# Patient Record
Sex: FEMALE | Race: WHITE | Employment: OTHER | URBAN - NONMETROPOLITAN AREA
[De-identification: names, ages, dates, MRNs, and addresses within clinical notes are randomized per-mention and may not be internally consistent; named-entity substitution may affect disease eponyms.]

---

## 2017-02-21 ENCOUNTER — DOCTOR'S OFFICE (OUTPATIENT)
Dept: URBAN - NONMETROPOLITAN AREA CLINIC 1 | Facility: CLINIC | Age: 67
Setting detail: OPHTHALMOLOGY
End: 2017-02-21
Payer: COMMERCIAL

## 2017-02-21 DIAGNOSIS — H25.13: ICD-10-CM

## 2017-02-21 DIAGNOSIS — H40.013: ICD-10-CM

## 2017-02-21 DIAGNOSIS — H04.122: ICD-10-CM

## 2017-02-21 DIAGNOSIS — H10.501: ICD-10-CM

## 2017-02-21 DIAGNOSIS — H04.121: ICD-10-CM

## 2017-02-21 DIAGNOSIS — H35.372: ICD-10-CM

## 2017-02-21 PROCEDURE — 92012 INTRM OPH EXAM EST PATIENT: CPT | Performed by: OPHTHALMOLOGY

## 2017-02-21 PROCEDURE — 83861 MICROFLUID ANALY TEARS: CPT | Performed by: OPHTHALMOLOGY

## 2017-02-21 ASSESSMENT — REFRACTION_MANIFEST
OS_VA3: 20/
OS_VA1: 20/
OD_VA3: 20/
OS_VA1: 20/
OS_VA3: 20/
OS_VA1: 20/
OD_VA1: 20/
OS_VA2: 20/
OD_VA2: 20/
OU_VA: 20/
OS_VA3: 20/
OD_VA1: 20/
OS_VA2: 20/
OD_VA1: 20/
OU_VA: 20/
OD_VA2: 20/
OS_VA2: 20/
OU_VA: 20/
OD_VA3: 20/
OD_VA3: 20/
OD_VA2: 20/

## 2017-02-21 ASSESSMENT — CONFRONTATIONAL VISUAL FIELD TEST (CVF)
OD_FINDINGS: FULL
OS_FINDINGS: FULL

## 2017-02-21 ASSESSMENT — VISUAL ACUITY
OS_BCVA: 20/30
OD_BCVA: 20/30

## 2017-02-21 ASSESSMENT — LID EXAM ASSESSMENTS
OS_BLEPHARITIS: LUL
OD_BLEPHARITIS: RUL

## 2017-02-21 ASSESSMENT — REFRACTION_CURRENTRX
OS_OVR_VA: 20/
OD_OVR_VA: 20/
OS_OVR_VA: 20/
OS_OVR_VA: 20/
OD_OVR_VA: 20/
OD_OVR_VA: 20/

## 2017-02-21 ASSESSMENT — DRY EYES - PHYSICIAN NOTES
OD_GENERALCOMMENTS: DRY EYE IMPROVED
OS_GENERALCOMMENTS: DRY EYE IMPROVED

## 2017-02-21 ASSESSMENT — REFRACTION_AUTOREFRACTION
OS_SPHERE: +0.25
OD_SPHERE: +0.25
OD_CYLINDER: -0.25
OS_CYLINDER: -0.50
OD_AXIS: 118
OS_AXIS: 048

## 2017-02-21 ASSESSMENT — SPHEQUIV_DERIVED
OD_SPHEQUIV: 0.125
OS_SPHEQUIV: 0

## 2017-02-28 ENCOUNTER — DOCTOR'S OFFICE (OUTPATIENT)
Dept: URBAN - NONMETROPOLITAN AREA CLINIC 1 | Facility: CLINIC | Age: 67
Setting detail: OPHTHALMOLOGY
End: 2017-02-28
Payer: COMMERCIAL

## 2017-02-28 ENCOUNTER — RX ONLY (RX ONLY)
Age: 67
End: 2017-02-28

## 2017-02-28 DIAGNOSIS — H10.501: ICD-10-CM

## 2017-02-28 DIAGNOSIS — H04.122: ICD-10-CM

## 2017-02-28 DIAGNOSIS — H04.121: ICD-10-CM

## 2017-02-28 PROCEDURE — 99212 OFFICE O/P EST SF 10 MIN: CPT | Performed by: OPHTHALMOLOGY

## 2017-02-28 ASSESSMENT — REFRACTION_CURRENTRX
OD_OVR_VA: 20/
OS_OVR_VA: 20/
OD_OVR_VA: 20/
OS_OVR_VA: 20/
OD_OVR_VA: 20/
OS_OVR_VA: 20/

## 2017-02-28 ASSESSMENT — REFRACTION_MANIFEST
OS_VA2: 20/
OD_VA1: 20/
OD_VA3: 20/
OS_VA1: 20/
OS_VA2: 20/
OS_VA2: 20/
OU_VA: 20/
OS_VA1: 20/
OU_VA: 20/
OD_VA2: 20/
OS_VA3: 20/
OS_VA3: 20/
OD_VA3: 20/
OS_VA1: 20/
OD_VA1: 20/
OD_VA3: 20/
OS_VA3: 20/
OD_VA2: 20/
OD_VA1: 20/
OD_VA2: 20/
OU_VA: 20/

## 2017-02-28 ASSESSMENT — REFRACTION_AUTOREFRACTION
OD_SPHERE: +0.25
OS_AXIS: 048
OS_CYLINDER: -0.50
OD_AXIS: 118
OD_CYLINDER: -0.25
OS_SPHERE: +0.25

## 2017-02-28 ASSESSMENT — DRY EYES - PHYSICIAN NOTES
OD_GENERALCOMMENTS: DRY EYE IMPROVED
OS_GENERALCOMMENTS: DRY EYE IMPROVED

## 2017-02-28 ASSESSMENT — LID EXAM ASSESSMENTS
OS_BLEPHARITIS: LUL
OD_BLEPHARITIS: RUL

## 2017-02-28 ASSESSMENT — SPHEQUIV_DERIVED
OD_SPHEQUIV: 0.125
OS_SPHEQUIV: 0

## 2017-02-28 ASSESSMENT — VISUAL ACUITY
OD_BCVA: 20/30-2
OS_BCVA: 20/30-2

## 2017-03-24 ENCOUNTER — DOCTOR'S OFFICE (OUTPATIENT)
Dept: URBAN - NONMETROPOLITAN AREA CLINIC 1 | Facility: CLINIC | Age: 67
Setting detail: OPHTHALMOLOGY
End: 2017-03-24
Payer: COMMERCIAL

## 2017-03-24 DIAGNOSIS — H25.13: ICD-10-CM

## 2017-03-24 DIAGNOSIS — H35.372: ICD-10-CM

## 2017-03-24 DIAGNOSIS — H40.013: ICD-10-CM

## 2017-03-24 DIAGNOSIS — H04.122: ICD-10-CM

## 2017-03-24 DIAGNOSIS — H04.121: ICD-10-CM

## 2017-03-24 PROBLEM — H10.501 BLEPHAROCONJUNCTIVITS NOS; RIGHT EYE: Status: RESOLVED | Noted: 2017-03-24 | Resolved: 2017-03-24

## 2017-03-24 PROCEDURE — 83861 MICROFLUID ANALY TEARS: CPT | Performed by: OPHTHALMOLOGY

## 2017-03-24 PROCEDURE — 92134 CPTRZ OPH DX IMG PST SGM RTA: CPT | Performed by: OPHTHALMOLOGY

## 2017-03-24 PROCEDURE — 92083 EXTENDED VISUAL FIELD XM: CPT | Performed by: OPHTHALMOLOGY

## 2017-03-24 PROCEDURE — 92014 COMPRE OPH EXAM EST PT 1/>: CPT | Performed by: OPHTHALMOLOGY

## 2017-03-24 ASSESSMENT — SPHEQUIV_DERIVED
OS_SPHEQUIV: 0
OD_SPHEQUIV: 0.125

## 2017-03-24 ASSESSMENT — REFRACTION_MANIFEST
OD_VA1: 20/
OS_VA1: 20/
OS_VA2: 20/
OU_VA: 20/
OD_VA1: 20/
OD_VA3: 20/
OS_VA2: 20/
OD_VA3: 20/
OD_VA2: 20/
OS_VA3: 20/
OS_VA1: 20/
OS_VA3: 20/
OD_VA3: 20/
OU_VA: 20/
OU_VA: 20/
OS_VA1: 20/
OD_VA1: 20/
OS_VA3: 20/
OD_VA2: 20/
OS_VA2: 20/
OD_VA2: 20/

## 2017-03-24 ASSESSMENT — REFRACTION_AUTOREFRACTION
OS_CYLINDER: -0.50
OD_SPHERE: +0.25
OD_AXIS: 118
OS_AXIS: 048
OD_CYLINDER: -0.25
OS_SPHERE: +0.25

## 2017-03-24 ASSESSMENT — VISUAL ACUITY
OD_BCVA: 20/25-2
OS_BCVA: 20/30+2

## 2017-03-24 ASSESSMENT — CONFRONTATIONAL VISUAL FIELD TEST (CVF)
OS_FINDINGS: FULL
OD_FINDINGS: FULL

## 2017-03-24 ASSESSMENT — REFRACTION_CURRENTRX
OD_OVR_VA: 20/
OS_OVR_VA: 20/
OD_OVR_VA: 20/
OD_OVR_VA: 20/

## 2017-03-24 ASSESSMENT — LID EXAM ASSESSMENTS
OS_BLEPHARITIS: LUL
OD_BLEPHARITIS: RUL

## 2017-03-24 ASSESSMENT — DRY EYES - PHYSICIAN NOTES
OD_GENERALCOMMENTS: DRY EYE IMPROVED
OS_GENERALCOMMENTS: DRY EYE IMPROVED

## 2017-09-26 ENCOUNTER — DOCTOR'S OFFICE (OUTPATIENT)
Dept: URBAN - NONMETROPOLITAN AREA CLINIC 1 | Facility: CLINIC | Age: 67
Setting detail: OPHTHALMOLOGY
End: 2017-09-26
Payer: COMMERCIAL

## 2017-09-26 DIAGNOSIS — H04.122: ICD-10-CM

## 2017-09-26 DIAGNOSIS — H40.013: ICD-10-CM

## 2017-09-26 DIAGNOSIS — H35.372: ICD-10-CM

## 2017-09-26 DIAGNOSIS — H04.121: ICD-10-CM

## 2017-09-26 DIAGNOSIS — H04.123: ICD-10-CM

## 2017-09-26 DIAGNOSIS — H25.13: ICD-10-CM

## 2017-09-26 PROCEDURE — 92250 FUNDUS PHOTOGRAPHY W/I&R: CPT | Performed by: OPHTHALMOLOGY

## 2017-09-26 PROCEDURE — 92133 CPTRZD OPH DX IMG PST SGM ON: CPT | Performed by: OPHTHALMOLOGY

## 2017-09-26 PROCEDURE — 83861 MICROFLUID ANALY TEARS: CPT | Performed by: OPHTHALMOLOGY

## 2017-09-26 PROCEDURE — 76514 ECHO EXAM OF EYE THICKNESS: CPT | Performed by: OPHTHALMOLOGY

## 2017-09-26 PROCEDURE — 92014 COMPRE OPH EXAM EST PT 1/>: CPT | Performed by: OPHTHALMOLOGY

## 2017-09-26 ASSESSMENT — REFRACTION_MANIFEST
OU_VA: 20/
OU_VA: 20/
OD_VA3: 20/
OS_VA3: 20/
OD_VA3: 20/
OS_VA3: 20/
OS_VA1: 20/
OS_VA2: 20/
OD_VA2: 20/
OS_VA1: 20/
OS_VA2: 20/
OD_VA2: 20/
OS_VA2: 20/
OU_VA: 20/
OD_VA1: 20/
OD_VA1: 20/
OD_VA3: 20/
OS_VA1: 20/
OD_VA1: 20/
OS_VA3: 20/
OD_VA2: 20/

## 2017-09-26 ASSESSMENT — SPHEQUIV_DERIVED
OD_SPHEQUIV: -0.75
OS_SPHEQUIV: 0.125

## 2017-09-26 ASSESSMENT — LID EXAM ASSESSMENTS
OS_BLEPHARITIS: LUL
OD_BLEPHARITIS: RUL

## 2017-09-26 ASSESSMENT — CONFRONTATIONAL VISUAL FIELD TEST (CVF)
OS_FINDINGS: FULL
OD_FINDINGS: FULL

## 2017-09-26 ASSESSMENT — REFRACTION_CURRENTRX
OS_OVR_VA: 20/
OD_OVR_VA: 20/
OD_OVR_VA: 20/
OS_OVR_VA: 20/
OD_OVR_VA: 20/
OS_OVR_VA: 20/

## 2017-09-26 ASSESSMENT — DRY EYES - PHYSICIAN NOTES
OS_GENERALCOMMENTS: DRY EYE IMPROVED
OD_GENERALCOMMENTS: DRY EYE IMPROVED

## 2017-09-26 ASSESSMENT — REFRACTION_AUTOREFRACTION
OS_AXIS: 069
OS_CYLINDER: -1.25
OD_CYLINDER: -1.00
OS_SPHERE: +0.75
OD_SPHERE: -0.25
OD_AXIS: 106

## 2017-09-26 ASSESSMENT — VISUAL ACUITY
OS_BCVA: 20/60+2
OD_BCVA: 20/30+1

## 2017-09-26 ASSESSMENT — PACHYMETRY
OS_CT_UM: 660
OS_CT_CORRECTION: >-7
OD_CT_UM: 670
OD_CT_CORRECTION: >-7

## 2017-11-22 ENCOUNTER — DOCTOR'S OFFICE (OUTPATIENT)
Dept: URBAN - NONMETROPOLITAN AREA CLINIC 1 | Facility: CLINIC | Age: 67
Setting detail: OPHTHALMOLOGY
End: 2017-11-22
Payer: COMMERCIAL

## 2017-11-22 DIAGNOSIS — T26.51XA: ICD-10-CM

## 2017-11-22 PROCEDURE — 92012 INTRM OPH EXAM EST PATIENT: CPT | Performed by: OPHTHALMOLOGY

## 2017-11-22 ASSESSMENT — REFRACTION_MANIFEST
OU_VA: 20/
OS_VA3: 20/
OS_VA3: 20/
OD_VA2: 20/
OD_VA3: 20/
OD_VA1: 20/
OD_VA3: 20/
OD_VA1: 20/
OS_VA1: 20/
OU_VA: 20/
OS_VA2: 20/
OS_VA1: 20/
OD_VA1: 20/
OD_VA3: 20/
OU_VA: 20/
OS_VA1: 20/
OS_VA3: 20/
OS_VA2: 20/
OD_VA2: 20/
OD_VA2: 20/
OS_VA2: 20/

## 2017-11-22 ASSESSMENT — REFRACTION_AUTOREFRACTION
OS_AXIS: 069
OS_CYLINDER: -1.25
OD_CYLINDER: -1.00
OD_SPHERE: -0.25
OS_SPHERE: +0.75
OD_AXIS: 106

## 2017-11-22 ASSESSMENT — REFRACTION_CURRENTRX
OS_OVR_VA: 20/
OD_OVR_VA: 20/

## 2017-11-22 ASSESSMENT — SPHEQUIV_DERIVED
OS_SPHEQUIV: 0.125
OD_SPHEQUIV: -0.75

## 2017-11-22 ASSESSMENT — LID EXAM ASSESSMENTS
OD_BLEPHARITIS: RUL
OS_BLEPHARITIS: LUL

## 2017-11-22 ASSESSMENT — VISUAL ACUITY
OD_BCVA: 20/30-1
OS_BCVA: 20/40

## 2017-11-22 ASSESSMENT — CONFRONTATIONAL VISUAL FIELD TEST (CVF)
OD_FINDINGS: FULL
OS_FINDINGS: FULL

## 2017-11-22 ASSESSMENT — DRY EYES - PHYSICIAN NOTES
OD_GENERALCOMMENTS: DRY EYE IMPROVED
OS_GENERALCOMMENTS: DRY EYE IMPROVED

## 2017-11-29 ENCOUNTER — RX ONLY (RX ONLY)
Age: 67
End: 2017-11-29

## 2017-11-29 ENCOUNTER — DOCTOR'S OFFICE (OUTPATIENT)
Dept: URBAN - NONMETROPOLITAN AREA CLINIC 1 | Facility: CLINIC | Age: 67
Setting detail: OPHTHALMOLOGY
End: 2017-11-29
Payer: COMMERCIAL

## 2017-11-29 DIAGNOSIS — T26.51XA: ICD-10-CM

## 2017-11-29 DIAGNOSIS — H40.033: ICD-10-CM

## 2017-11-29 PROCEDURE — 92012 INTRM OPH EXAM EST PATIENT: CPT | Performed by: OPHTHALMOLOGY

## 2017-11-29 PROCEDURE — 92133 CPTRZD OPH DX IMG PST SGM ON: CPT | Performed by: OPHTHALMOLOGY

## 2017-11-29 ASSESSMENT — REFRACTION_MANIFEST
OS_VA1: 20/
OS_VA2: 20/
OS_VA3: 20/
OD_VA2: 20/
OS_VA1: 20/
OU_VA: 20/
OS_VA3: 20/
OS_VA2: 20/
OD_VA2: 20/
OD_VA2: 20/
OD_VA3: 20/
OD_VA1: 20/
OD_VA1: 20/
OD_VA3: 20/
OD_VA3: 20/
OU_VA: 20/
OD_VA1: 20/
OS_VA1: 20/
OU_VA: 20/
OS_VA3: 20/
OS_VA2: 20/

## 2017-11-29 ASSESSMENT — REFRACTION_AUTOREFRACTION
OS_CYLINDER: -1.25
OD_CYLINDER: -1.00
OS_AXIS: 069
OD_SPHERE: -0.25
OD_AXIS: 106
OS_SPHERE: +0.75

## 2017-11-29 ASSESSMENT — CONFRONTATIONAL VISUAL FIELD TEST (CVF)
OD_FINDINGS: FULL
OS_FINDINGS: FULL

## 2017-11-29 ASSESSMENT — REFRACTION_CURRENTRX
OS_OVR_VA: 20/
OD_OVR_VA: 20/
OS_OVR_VA: 20/
OD_OVR_VA: 20/
OD_OVR_VA: 20/
OS_OVR_VA: 20/

## 2017-11-29 ASSESSMENT — SPHEQUIV_DERIVED
OS_SPHEQUIV: 0.125
OD_SPHEQUIV: -0.75

## 2017-11-29 ASSESSMENT — VISUAL ACUITY
OD_BCVA: 20/30-1
OS_BCVA: 20/70

## 2017-11-29 ASSESSMENT — DRY EYES - PHYSICIAN NOTES
OS_GENERALCOMMENTS: DRY EYE IMPROVED
OD_GENERALCOMMENTS: DRY EYE IMPROVED

## 2017-11-29 ASSESSMENT — LID EXAM ASSESSMENTS
OS_BLEPHARITIS: LUL
OD_BLEPHARITIS: RUL

## 2018-03-28 ENCOUNTER — DOCTOR'S OFFICE (OUTPATIENT)
Dept: URBAN - NONMETROPOLITAN AREA CLINIC 1 | Facility: CLINIC | Age: 68
Setting detail: OPHTHALMOLOGY
End: 2018-03-28
Payer: COMMERCIAL

## 2018-03-28 DIAGNOSIS — H35.372: ICD-10-CM

## 2018-03-28 DIAGNOSIS — H25.13: ICD-10-CM

## 2018-03-28 DIAGNOSIS — H40.033: ICD-10-CM

## 2018-03-28 DIAGNOSIS — H04.122: ICD-10-CM

## 2018-03-28 DIAGNOSIS — H40.053: ICD-10-CM

## 2018-03-28 DIAGNOSIS — H04.123: ICD-10-CM

## 2018-03-28 DIAGNOSIS — H04.121: ICD-10-CM

## 2018-03-28 PROCEDURE — 83861 MICROFLUID ANALY TEARS: CPT | Performed by: OPHTHALMOLOGY

## 2018-03-28 PROCEDURE — 92014 COMPRE OPH EXAM EST PT 1/>: CPT | Performed by: OPHTHALMOLOGY

## 2018-03-28 PROCEDURE — 92083 EXTENDED VISUAL FIELD XM: CPT | Performed by: OPHTHALMOLOGY

## 2018-03-28 PROCEDURE — 92132 CPTRZD OPH DX IMG ANT SGM: CPT | Performed by: OPHTHALMOLOGY

## 2018-03-28 ASSESSMENT — REFRACTION_AUTOREFRACTION
OS_SPHERE: +0.50
OD_SPHERE: -1.50
OS_CYLINDER: -1.25
OD_AXIS: 126
OS_AXIS: 071
OD_CYLINDER: -0.75

## 2018-03-28 ASSESSMENT — DRY EYES - PHYSICIAN NOTES
OD_GENERALCOMMENTS: DRY EYE IMPROVED
OS_GENERALCOMMENTS: DRY EYE IMPROVED

## 2018-03-28 ASSESSMENT — SPHEQUIV_DERIVED
OS_SPHEQUIV: -0.125
OD_SPHEQUIV: -1.875

## 2018-03-28 ASSESSMENT — REFRACTION_MANIFEST
OS_VA3: 20/
OS_VA1: 20/
OD_VA1: 20/
OD_VA3: 20/
OD_VA2: 20/
OD_VA1: 20/
OU_VA: 20/
OS_VA3: 20/
OD_VA2: 20/
OD_VA2: 20/
OS_VA2: 20/
OD_VA3: 20/
OU_VA: 20/
OS_VA2: 20/
OS_VA1: 20/
OD_VA3: 20/
OS_VA1: 20/
OD_VA1: 20/
OS_VA3: 20/
OU_VA: 20/
OS_VA2: 20/

## 2018-03-28 ASSESSMENT — LID EXAM ASSESSMENTS
OD_BLEPHARITIS: RUL
OS_BLEPHARITIS: LUL

## 2018-03-28 ASSESSMENT — REFRACTION_CURRENTRX
OD_OVR_VA: 20/
OS_OVR_VA: 20/
OD_OVR_VA: 20/
OS_OVR_VA: 20/
OS_OVR_VA: 20/
OD_OVR_VA: 20/

## 2018-03-28 ASSESSMENT — CONFRONTATIONAL VISUAL FIELD TEST (CVF)
OD_FINDINGS: FULL
OS_FINDINGS: FULL

## 2018-03-28 ASSESSMENT — VISUAL ACUITY
OD_BCVA: 20/30+1
OS_BCVA: 20/30

## 2018-04-10 ENCOUNTER — AMBUL SURGICAL CARE (OUTPATIENT)
Dept: URBAN - NONMETROPOLITAN AREA SURGERY 1 | Facility: SURGERY | Age: 68
Setting detail: OPHTHALMOLOGY
End: 2018-04-10
Payer: COMMERCIAL

## 2018-04-10 DIAGNOSIS — H40.032: ICD-10-CM

## 2018-04-10 PROCEDURE — G8907 PT DOC NO EVENTS ON DISCHARG: HCPCS | Performed by: OPHTHALMOLOGY

## 2018-04-10 PROCEDURE — G8918 PT W/O PREOP ORDER IV AB PRO: HCPCS | Performed by: OPHTHALMOLOGY

## 2018-04-10 PROCEDURE — 66761 REVISION OF IRIS: CPT | Performed by: OPHTHALMOLOGY

## 2018-04-24 ENCOUNTER — AMBUL SURGICAL CARE (OUTPATIENT)
Dept: URBAN - NONMETROPOLITAN AREA SURGERY 1 | Facility: SURGERY | Age: 68
Setting detail: OPHTHALMOLOGY
End: 2018-04-24
Payer: COMMERCIAL

## 2018-04-24 DIAGNOSIS — H40.031: ICD-10-CM

## 2018-04-24 PROCEDURE — G8918 PT W/O PREOP ORDER IV AB PRO: HCPCS | Performed by: OPHTHALMOLOGY

## 2018-04-24 PROCEDURE — G8907 PT DOC NO EVENTS ON DISCHARG: HCPCS | Performed by: OPHTHALMOLOGY

## 2018-04-24 PROCEDURE — 66761 REVISION OF IRIS: CPT | Performed by: OPHTHALMOLOGY

## 2018-06-12 ENCOUNTER — DOCTOR'S OFFICE (OUTPATIENT)
Dept: URBAN - NONMETROPOLITAN AREA CLINIC 1 | Facility: CLINIC | Age: 68
Setting detail: OPHTHALMOLOGY
End: 2018-06-12
Payer: COMMERCIAL

## 2018-06-12 DIAGNOSIS — H04.123: ICD-10-CM

## 2018-06-12 DIAGNOSIS — H35.372: ICD-10-CM

## 2018-06-12 DIAGNOSIS — H40.053: ICD-10-CM

## 2018-06-12 DIAGNOSIS — H40.033: ICD-10-CM

## 2018-06-12 DIAGNOSIS — H25.13: ICD-10-CM

## 2018-06-12 PROCEDURE — 92134 CPTRZ OPH DX IMG PST SGM RTA: CPT | Performed by: OPHTHALMOLOGY

## 2018-06-12 PROCEDURE — 92014 COMPRE OPH EXAM EST PT 1/>: CPT | Performed by: OPHTHALMOLOGY

## 2018-06-12 ASSESSMENT — VISUAL ACUITY
OS_BCVA: 20/60-2
OD_BCVA: 20/50+1

## 2018-06-12 ASSESSMENT — REFRACTION_MANIFEST
OS_VA3: 20/
OU_VA: 20/
OS_VA1: 20/
OD_VA1: 20/
OS_VA3: 20/
OS_VA2: 20/
OS_VA3: 20/
OS_VA2: 20/
OD_VA2: 20/
OS_VA1: 20/
OS_VA2: 20/
OU_VA: 20/
OD_VA1: 20/
OS_VA1: 20/
OD_VA3: 20/
OU_VA: 20/
OD_VA1: 20/
OD_VA3: 20/
OD_VA2: 20/
OD_VA3: 20/
OD_VA2: 20/

## 2018-06-12 ASSESSMENT — REFRACTION_CURRENTRX
OD_OVR_VA: 20/
OS_OVR_VA: 20/
OD_OVR_VA: 20/
OS_OVR_VA: 20/
OS_OVR_VA: 20/
OD_OVR_VA: 20/

## 2018-06-12 ASSESSMENT — LID EXAM ASSESSMENTS
OD_BLEPHARITIS: RUL
OS_BLEPHARITIS: LUL

## 2018-06-12 ASSESSMENT — SPHEQUIV_DERIVED
OS_SPHEQUIV: -0.125
OD_SPHEQUIV: -1.875

## 2018-06-12 ASSESSMENT — CONFRONTATIONAL VISUAL FIELD TEST (CVF)
OD_FINDINGS: FULL
OS_FINDINGS: FULL

## 2018-06-12 ASSESSMENT — REFRACTION_AUTOREFRACTION
OD_CYLINDER: -0.75
OS_AXIS: 071
OS_CYLINDER: -1.25
OD_SPHERE: -1.50
OD_AXIS: 126
OS_SPHERE: +0.50

## 2018-06-12 ASSESSMENT — DRY EYES - PHYSICIAN NOTES
OD_GENERALCOMMENTS: DRY EYE IMPROVED
OS_GENERALCOMMENTS: DRY EYE IMPROVED

## 2018-07-03 ENCOUNTER — DOCTOR'S OFFICE (OUTPATIENT)
Dept: URBAN - NONMETROPOLITAN AREA CLINIC 1 | Facility: CLINIC | Age: 68
Setting detail: OPHTHALMOLOGY
End: 2018-07-03
Payer: COMMERCIAL

## 2018-07-03 DIAGNOSIS — H25.11: ICD-10-CM

## 2018-07-03 PROCEDURE — 92136 OPHTHALMIC BIOMETRY: CPT | Performed by: OPHTHALMOLOGY

## 2018-08-02 ENCOUNTER — AMBUL SURGICAL CARE (OUTPATIENT)
Dept: URBAN - NONMETROPOLITAN AREA SURGERY 1 | Facility: SURGERY | Age: 68
Setting detail: OPHTHALMOLOGY
End: 2018-08-02
Payer: COMMERCIAL

## 2018-08-02 DIAGNOSIS — H25.11: ICD-10-CM

## 2018-08-02 DIAGNOSIS — H25.011: ICD-10-CM

## 2018-08-02 DIAGNOSIS — H25.041: ICD-10-CM

## 2018-08-02 PROCEDURE — G8907 PT DOC NO EVENTS ON DISCHARG: HCPCS | Performed by: OPHTHALMOLOGY

## 2018-08-02 PROCEDURE — 66984 XCAPSL CTRC RMVL W/O ECP: CPT | Performed by: OPHTHALMOLOGY

## 2018-08-02 PROCEDURE — G8918 PT W/O PREOP ORDER IV AB PRO: HCPCS | Performed by: OPHTHALMOLOGY

## 2018-08-03 ENCOUNTER — RX ONLY (RX ONLY)
Age: 68
End: 2018-08-03

## 2018-08-03 ENCOUNTER — DOCTOR'S OFFICE (OUTPATIENT)
Dept: URBAN - NONMETROPOLITAN AREA CLINIC 1 | Facility: CLINIC | Age: 68
Setting detail: OPHTHALMOLOGY
End: 2018-08-03
Payer: COMMERCIAL

## 2018-08-03 DIAGNOSIS — H25.12: ICD-10-CM

## 2018-08-03 DIAGNOSIS — Z96.1: ICD-10-CM

## 2018-08-03 PROCEDURE — 92136 OPHTHALMIC BIOMETRY: CPT | Performed by: OPHTHALMOLOGY

## 2018-08-03 PROCEDURE — 99024 POSTOP FOLLOW-UP VISIT: CPT | Performed by: PHYSICIAN ASSISTANT

## 2018-08-03 ASSESSMENT — LID EXAM ASSESSMENTS
OD_BLEPHARITIS: RUL
OS_BLEPHARITIS: LUL

## 2018-08-03 ASSESSMENT — DRY EYES - PHYSICIAN NOTES: OS_GENERALCOMMENTS: DRY EYE IMPROVED

## 2018-08-06 ASSESSMENT — REFRACTION_MANIFEST
OU_VA: 20/
OD_VA2: 20/
OD_VA2: 20/
OS_VA1: 20/
OS_VA1: 20/
OD_VA3: 20/
OS_VA2: 20/
OU_VA: 20/
OS_VA2: 20/
OD_VA1: 20/
OD_VA2: 20/
OS_VA3: 20/
OD_VA3: 20/
OD_VA1: 20/
OS_VA1: 20/
OS_VA3: 20/
OS_VA3: 20/
OU_VA: 20/
OS_VA2: 20/
OD_VA1: 20/
OD_VA3: 20/

## 2018-08-06 ASSESSMENT — REFRACTION_AUTOREFRACTION
OS_AXIS: 060
OD_SPHERE: +0.75
OS_SPHERE: +0.75
OD_CYLINDER: -0.75
OS_CYLINDER: -1.75
OD_AXIS: 145

## 2018-08-06 ASSESSMENT — REFRACTION_CURRENTRX
OD_OVR_VA: 20/
OS_OVR_VA: 20/
OS_OVR_VA: 20/
OD_OVR_VA: 20/
OD_OVR_VA: 20/
OS_OVR_VA: 20/

## 2018-08-06 ASSESSMENT — VISUAL ACUITY
OS_BCVA: 20/20
OD_BCVA: 20/50+1

## 2018-08-06 ASSESSMENT — SPHEQUIV_DERIVED
OS_SPHEQUIV: -0.125
OD_SPHEQUIV: 0.375

## 2018-08-15 ENCOUNTER — RX ONLY (RX ONLY)
Age: 68
End: 2018-08-15

## 2018-08-15 ENCOUNTER — DOCTOR'S OFFICE (OUTPATIENT)
Dept: URBAN - NONMETROPOLITAN AREA CLINIC 1 | Facility: CLINIC | Age: 68
Setting detail: OPHTHALMOLOGY
End: 2018-08-15
Payer: COMMERCIAL

## 2018-08-15 DIAGNOSIS — Z96.1: ICD-10-CM

## 2018-08-15 DIAGNOSIS — H25.12: ICD-10-CM

## 2018-08-15 PROCEDURE — 99024 POSTOP FOLLOW-UP VISIT: CPT | Performed by: PHYSICIAN ASSISTANT

## 2018-08-15 ASSESSMENT — LID EXAM ASSESSMENTS
OS_BLEPHARITIS: LUL
OD_BLEPHARITIS: RUL

## 2018-08-15 ASSESSMENT — DRY EYES - PHYSICIAN NOTES: OS_GENERALCOMMENTS: DRY EYE IMPROVED

## 2018-08-17 ASSESSMENT — REFRACTION_MANIFEST
OD_VA1: 20/
OD_VA3: 20/
OU_VA: 20/
OD_VA3: 20/
OD_VA2: 20/
OS_VA1: 20/
OS_VA2: 20/
OU_VA: 20/
OD_VA2: 20/
OS_VA1: 20/
OD_VA1: 20/
OS_VA2: 20/
OD_VA1: 20/
OU_VA: 20/
OD_VA2: 20/
OS_VA3: 20/
OS_VA2: 20/
OS_VA1: 20/
OS_VA3: 20/
OS_VA3: 20/
OD_VA3: 20/

## 2018-08-17 ASSESSMENT — REFRACTION_CURRENTRX
OS_OVR_VA: 20/
OD_OVR_VA: 20/

## 2018-08-17 ASSESSMENT — REFRACTION_AUTOREFRACTION
OD_SPHERE: -0.25
OS_SPHERE: +0.75
OD_AXIS: 000
OS_AXIS: 060
OD_CYLINDER: 0.00
OS_CYLINDER: -1.75

## 2018-08-17 ASSESSMENT — VISUAL ACUITY
OD_BCVA: 20/30-2
OS_BCVA: 20/20

## 2018-08-17 ASSESSMENT — SPHEQUIV_DERIVED
OS_SPHEQUIV: -0.125
OD_SPHEQUIV: -0.25

## 2018-09-20 ENCOUNTER — AMBUL SURGICAL CARE (OUTPATIENT)
Dept: URBAN - NONMETROPOLITAN AREA SURGERY 1 | Facility: SURGERY | Age: 68
Setting detail: OPHTHALMOLOGY
End: 2018-09-20
Payer: COMMERCIAL

## 2018-09-20 DIAGNOSIS — H25.042: ICD-10-CM

## 2018-09-20 DIAGNOSIS — H25.12: ICD-10-CM

## 2018-09-20 DIAGNOSIS — H25.012: ICD-10-CM

## 2018-09-20 PROCEDURE — 66984 XCAPSL CTRC RMVL W/O ECP: CPT | Performed by: OPHTHALMOLOGY

## 2018-09-20 PROCEDURE — G8918 PT W/O PREOP ORDER IV AB PRO: HCPCS | Performed by: OPHTHALMOLOGY

## 2018-09-20 PROCEDURE — G8907 PT DOC NO EVENTS ON DISCHARG: HCPCS | Performed by: OPHTHALMOLOGY

## 2018-09-21 ENCOUNTER — DOCTOR'S OFFICE (OUTPATIENT)
Dept: URBAN - NONMETROPOLITAN AREA CLINIC 1 | Facility: CLINIC | Age: 68
Setting detail: OPHTHALMOLOGY
End: 2018-09-21
Payer: COMMERCIAL

## 2018-09-21 DIAGNOSIS — Z96.1: ICD-10-CM

## 2018-09-21 PROBLEM — H25.12 CATARACT NUCLEAR SCLEROSIS; LEFT EYE: Status: RESOLVED | Noted: 2018-08-03 | Resolved: 2018-09-21

## 2018-09-21 PROCEDURE — 99024 POSTOP FOLLOW-UP VISIT: CPT | Performed by: PHYSICIAN ASSISTANT

## 2018-09-21 ASSESSMENT — LID EXAM ASSESSMENTS
OD_BLEPHARITIS: RUL
OS_BLEPHARITIS: LUL

## 2018-09-21 ASSESSMENT — DRY EYES - PHYSICIAN NOTES: OS_GENERALCOMMENTS: DRY EYE IMPROVED

## 2018-09-24 ASSESSMENT — REFRACTION_MANIFEST
OD_VA2: 20/
OD_VA1: 20/
OS_VA1: 20/
OD_VA3: 20/
OS_VA3: 20/
OU_VA: 20/
OD_VA1: 20/
OS_VA2: 20/
OS_VA2: 20/
OD_VA3: 20/
OD_VA2: 20/
OS_VA1: 20/
OU_VA: 20/
OS_VA3: 20/

## 2018-09-24 ASSESSMENT — VISUAL ACUITY
OS_BCVA: 20/15-1
OD_BCVA: 20/20-1

## 2018-09-24 ASSESSMENT — REFRACTION_CURRENTRX
OS_OVR_VA: 20/
OS_OVR_VA: 20/
OD_OVR_VA: 20/
OS_OVR_VA: 20/

## 2018-09-24 ASSESSMENT — REFRACTION_AUTOREFRACTION
OS_AXIS: 066
OD_AXIS: 128
OS_SPHERE: +0.75
OD_SPHERE: +0.75
OS_CYLINDER: -0.50
OD_CYLINDER: -0.75

## 2018-09-24 ASSESSMENT — SPHEQUIV_DERIVED
OS_SPHEQUIV: 0.5
OD_SPHEQUIV: 0.375

## 2018-10-03 ENCOUNTER — DOCTOR'S OFFICE (OUTPATIENT)
Dept: URBAN - NONMETROPOLITAN AREA CLINIC 1 | Facility: CLINIC | Age: 68
Setting detail: OPHTHALMOLOGY
End: 2018-10-03
Payer: COMMERCIAL

## 2018-10-03 DIAGNOSIS — Z96.1: ICD-10-CM

## 2018-10-03 PROCEDURE — 99024 POSTOP FOLLOW-UP VISIT: CPT | Performed by: PHYSICIAN ASSISTANT

## 2018-10-03 ASSESSMENT — LID EXAM ASSESSMENTS
OS_BLEPHARITIS: LUL
OD_BLEPHARITIS: RUL

## 2018-10-03 ASSESSMENT — DRY EYES - PHYSICIAN NOTES: OS_GENERALCOMMENTS: DRY EYE IMPROVED

## 2018-10-04 ASSESSMENT — REFRACTION_MANIFEST
OS_VA3: 20/
OS_VA1: 20/
OU_VA: 20/
OD_VA3: 20/
OD_VA2: 20/
OD_VA2: 20/
OD_VA1: 20/
OD_VA3: 20/
OS_VA2: 20/
OS_VA3: 20/
OU_VA: 20/
OS_VA2: 20/
OD_VA1: 20/
OS_VA1: 20/

## 2018-10-04 ASSESSMENT — REFRACTION_AUTOREFRACTION
OS_AXIS: 066
OD_CYLINDER: -0.75
OS_SPHERE: +0.75
OS_CYLINDER: -0.50
OD_AXIS: 128
OD_SPHERE: +0.75

## 2018-10-04 ASSESSMENT — REFRACTION_CURRENTRX
OS_OVR_VA: 20/
OS_OVR_VA: 20/
OD_OVR_VA: 20/
OS_OVR_VA: 20/
OD_OVR_VA: 20/
OD_OVR_VA: 20/

## 2018-10-04 ASSESSMENT — VISUAL ACUITY
OS_BCVA: 20/15
OD_BCVA: 20/20-1

## 2018-10-04 ASSESSMENT — SPHEQUIV_DERIVED
OD_SPHEQUIV: 0.375
OS_SPHEQUIV: 0.5

## 2018-11-13 ENCOUNTER — DOCTOR'S OFFICE (OUTPATIENT)
Dept: URBAN - NONMETROPOLITAN AREA CLINIC 1 | Facility: CLINIC | Age: 68
Setting detail: OPHTHALMOLOGY
End: 2018-11-13

## 2018-11-13 DIAGNOSIS — Z96.1: ICD-10-CM

## 2018-11-13 DIAGNOSIS — H52.4: ICD-10-CM

## 2018-11-13 PROCEDURE — 92015 DETERMINE REFRACTIVE STATE: CPT | Performed by: OPTOMETRIST

## 2018-11-13 ASSESSMENT — REFRACTION_AUTOREFRACTION
OS_CYLINDER: -0.50
OS_SPHERE: +0.50
OD_SPHERE: +1.00
OD_AXIS: 180
OD_CYLINDER: 0.00
OS_AXIS: 064

## 2018-11-13 ASSESSMENT — REFRACTION_MANIFEST
OS_VA1: 20/20-2
OS_SPHERE: +0.50
OD_VA2: 20/
OS_VA3: 20/
OD_VA2: 20/20
OD_VA1: 20/20
OD_CYLINDER: SPH
OS_VA1: 20/
OU_VA: 20/
OS_CYLINDER: -0.50
OU_VA: 20/
OS_AXIS: 065
OD_ADD: +2.50
OS_VA2: 20/20-2
OD_VA1: 20/
OS_VA2: 20/
OD_VA3: 20/
OS_ADD: +2.50
OD_SPHERE: PLANO
OD_VA3: 20/
OS_VA3: 20/

## 2018-11-13 ASSESSMENT — VISUAL ACUITY
OD_BCVA: 20/20-2
OS_BCVA: 20/20-1

## 2018-11-13 ASSESSMENT — SPHEQUIV_DERIVED
OS_SPHEQUIV: 0.25
OS_SPHEQUIV: 0.25
OD_SPHEQUIV: 1

## 2018-11-13 ASSESSMENT — REFRACTION_CURRENTRX
OS_OVR_VA: 20/
OD_OVR_VA: 20/
OD_OVR_VA: 20/
OS_OVR_VA: 20/
OS_OVR_VA: 20/
OD_OVR_VA: 20/

## 2018-11-19 ENCOUNTER — OPTICAL (OUTPATIENT)
Dept: URBAN - NONMETROPOLITAN AREA CLINIC 6 | Facility: CLINIC | Age: 68
Setting detail: OPHTHALMOLOGY
End: 2018-11-19
Payer: COMMERCIAL

## 2018-11-19 DIAGNOSIS — Z96.1: ICD-10-CM

## 2018-11-19 PROCEDURE — V2020 VISION SVCS FRAMES PURCHASES: HCPCS | Performed by: OPTOMETRIST

## 2018-11-19 PROCEDURE — V2100 LENS SPHER SINGLE PLANO 4.00: HCPCS | Performed by: OPTOMETRIST

## 2018-11-19 PROCEDURE — V2103 SPHEROCYLINDR 4.00D/12-2.00D: HCPCS | Performed by: OPTOMETRIST

## 2019-01-18 ENCOUNTER — DOCTOR'S OFFICE (OUTPATIENT)
Dept: URBAN - NONMETROPOLITAN AREA CLINIC 1 | Facility: CLINIC | Age: 69
Setting detail: OPHTHALMOLOGY
End: 2019-01-18
Payer: COMMERCIAL

## 2019-01-18 DIAGNOSIS — H43.811: ICD-10-CM

## 2019-01-18 DIAGNOSIS — H53.19: ICD-10-CM

## 2019-01-18 PROCEDURE — 92014 COMPRE OPH EXAM EST PT 1/>: CPT | Performed by: OPHTHALMOLOGY

## 2019-01-18 PROCEDURE — 92225 OPHTHALMOSCOPY EXTENDED INITIAL: CPT | Performed by: OPHTHALMOLOGY

## 2019-01-18 ASSESSMENT — REFRACTION_MANIFEST
OD_VA2: 20/20
OS_VA2: 20/20-2
OS_VA2: 20/
OS_VA1: 20/
OD_VA2: 20/
OS_ADD: +2.50
OD_VA1: 20/20
OS_VA1: 20/20-2
OD_VA1: 20/
OD_ADD: +2.50
OD_VA3: 20/
OS_SPHERE: +0.50
OD_SPHERE: PLANO
OD_VA3: 20/
OU_VA: 20/
OS_CYLINDER: -0.50
OU_VA: 20/
OS_AXIS: 065
OS_VA3: 20/
OS_VA3: 20/
OD_CYLINDER: SPH

## 2019-01-18 ASSESSMENT — DRY EYES - PHYSICIAN NOTES: OS_GENERALCOMMENTS: DRY EYE IMPROVED

## 2019-01-18 ASSESSMENT — REFRACTION_CURRENTRX
OD_OVR_VA: 20/
OD_OVR_VA: 20/
OS_OVR_VA: 20/
OD_OVR_VA: 20/

## 2019-01-18 ASSESSMENT — LID EXAM ASSESSMENTS
OD_BLEPHARITIS: RUL
OS_BLEPHARITIS: LUL

## 2019-01-18 ASSESSMENT — CONFRONTATIONAL VISUAL FIELD TEST (CVF)
OD_FINDINGS: FULL
OS_FINDINGS: FULL

## 2019-01-18 ASSESSMENT — SPHEQUIV_DERIVED
OS_SPHEQUIV: 0.25
OD_SPHEQUIV: 1
OS_SPHEQUIV: 0.25

## 2019-01-18 ASSESSMENT — REFRACTION_AUTOREFRACTION
OS_SPHERE: +0.50
OS_CYLINDER: -0.50
OD_SPHERE: +1.00
OD_CYLINDER: 0.00
OD_AXIS: 180
OS_AXIS: 064

## 2019-01-18 ASSESSMENT — VISUAL ACUITY
OS_BCVA: 20/20-1
OD_BCVA: 20/20-2

## 2019-02-01 ENCOUNTER — DOCTOR'S OFFICE (OUTPATIENT)
Dept: URBAN - NONMETROPOLITAN AREA CLINIC 1 | Facility: CLINIC | Age: 69
Setting detail: OPHTHALMOLOGY
End: 2019-02-01
Payer: COMMERCIAL

## 2019-02-01 DIAGNOSIS — H04.121: ICD-10-CM

## 2019-02-01 DIAGNOSIS — H04.122: ICD-10-CM

## 2019-02-01 DIAGNOSIS — H04.123: ICD-10-CM

## 2019-02-01 DIAGNOSIS — H40.013: ICD-10-CM

## 2019-02-01 DIAGNOSIS — H43.811: ICD-10-CM

## 2019-02-01 DIAGNOSIS — H35.372: ICD-10-CM

## 2019-02-01 PROCEDURE — 83861 MICROFLUID ANALY TEARS: CPT | Performed by: OPHTHALMOLOGY

## 2019-02-01 PROCEDURE — 92014 COMPRE OPH EXAM EST PT 1/>: CPT | Performed by: OPHTHALMOLOGY

## 2019-02-01 ASSESSMENT — REFRACTION_AUTOREFRACTION
OD_CYLINDER: 0.00
OS_CYLINDER: -0.50
OD_SPHERE: +1.00
OD_AXIS: 180
OS_SPHERE: +0.50
OS_AXIS: 064

## 2019-02-01 ASSESSMENT — REFRACTION_MANIFEST
OD_VA3: 20/
OD_ADD: +2.50
OS_VA3: 20/
OD_VA2: 20/20
OU_VA: 20/
OS_VA2: 20/
OS_VA3: 20/
OD_VA1: 20/20
OS_CYLINDER: -0.50
OS_ADD: +2.50
OS_AXIS: 065
OD_VA3: 20/
OD_VA2: 20/
OD_CYLINDER: SPH
OD_VA1: 20/
OU_VA: 20/
OS_SPHERE: +0.50
OS_VA1: 20/
OD_SPHERE: PLANO
OS_VA1: 20/20-2
OS_VA2: 20/20-2

## 2019-02-01 ASSESSMENT — CONFRONTATIONAL VISUAL FIELD TEST (CVF)
OD_FINDINGS: FULL
OS_FINDINGS: FULL

## 2019-02-01 ASSESSMENT — REFRACTION_CURRENTRX
OD_OVR_VA: 20/
OS_OVR_VA: 20/
OS_OVR_VA: 20/
OD_OVR_VA: 20/
OS_OVR_VA: 20/
OD_OVR_VA: 20/

## 2019-02-01 ASSESSMENT — VISUAL ACUITY
OS_BCVA: 20/20-1
OD_BCVA: 20/20-2

## 2019-02-01 ASSESSMENT — SPHEQUIV_DERIVED
OS_SPHEQUIV: 0.25
OD_SPHEQUIV: 1
OS_SPHEQUIV: 0.25

## 2019-02-01 ASSESSMENT — LID EXAM ASSESSMENTS
OD_BLEPHARITIS: RUL
OS_BLEPHARITIS: LUL

## 2019-02-01 ASSESSMENT — DRY EYES - PHYSICIAN NOTES: OS_GENERALCOMMENTS: DRY EYE IMPROVED

## 2019-02-13 ENCOUNTER — DOCTOR'S OFFICE (OUTPATIENT)
Dept: URBAN - NONMETROPOLITAN AREA CLINIC 1 | Facility: CLINIC | Age: 69
Setting detail: OPHTHALMOLOGY
End: 2019-02-13
Payer: COMMERCIAL

## 2019-02-13 DIAGNOSIS — H40.013: ICD-10-CM

## 2019-02-13 DIAGNOSIS — H04.122: ICD-10-CM

## 2019-02-13 DIAGNOSIS — H35.372: ICD-10-CM

## 2019-02-13 DIAGNOSIS — Z96.1: ICD-10-CM

## 2019-02-13 DIAGNOSIS — H04.123: ICD-10-CM

## 2019-02-13 DIAGNOSIS — H43.811: ICD-10-CM

## 2019-02-13 DIAGNOSIS — H04.121: ICD-10-CM

## 2019-02-13 PROBLEM — H40.033 NARROW ANGLE; BOTH EYES ;: Status: RESOLVED | Noted: 2017-11-29 | Resolved: 2019-02-13

## 2019-02-13 PROBLEM — T26.51XA: Status: RESOLVED | Noted: 2017-11-22 | Resolved: 2019-02-13

## 2019-02-13 PROCEDURE — 92133 CPTRZD OPH DX IMG PST SGM ON: CPT | Performed by: OPHTHALMOLOGY

## 2019-02-13 PROCEDURE — 92014 COMPRE OPH EXAM EST PT 1/>: CPT | Performed by: OPHTHALMOLOGY

## 2019-02-13 ASSESSMENT — REFRACTION_AUTOREFRACTION
OD_AXIS: 180
OS_CYLINDER: -0.50
OS_SPHERE: +0.25
OS_AXIS: 079
OD_SPHERE: +0.25
OD_CYLINDER: 0.00

## 2019-02-13 ASSESSMENT — REFRACTION_MANIFEST
OD_VA2: 20/
OS_CYLINDER: -0.50
OU_VA: 20/
OD_VA1: 20/
OU_VA: 20/
OD_CYLINDER: SPH
OS_VA2: 20/
OD_VA1: 20/20
OD_SPHERE: PLANO
OS_VA3: 20/
OS_VA1: 20/20-2
OS_AXIS: 065
OD_VA3: 20/
OD_VA3: 20/
OS_SPHERE: +0.50
OS_ADD: +2.50
OD_VA2: 20/20
OS_VA3: 20/
OS_VA2: 20/20-2
OD_ADD: +2.50
OS_VA1: 20/

## 2019-02-13 ASSESSMENT — REFRACTION_CURRENTRX
OS_OVR_VA: 20/
OD_OVR_VA: 20/
OD_OVR_VA: 20/
OS_OVR_VA: 20/
OD_OVR_VA: 20/
OS_OVR_VA: 20/

## 2019-02-13 ASSESSMENT — LID EXAM ASSESSMENTS
OS_BLEPHARITIS: LUL
OD_BLEPHARITIS: RUL

## 2019-02-13 ASSESSMENT — VISUAL ACUITY
OD_BCVA: 20/20-1
OS_BCVA: 20/20-1

## 2019-02-13 ASSESSMENT — SPHEQUIV_DERIVED
OS_SPHEQUIV: 0.25
OS_SPHEQUIV: 0
OD_SPHEQUIV: 0.25

## 2019-02-13 ASSESSMENT — CONFRONTATIONAL VISUAL FIELD TEST (CVF)
OS_FINDINGS: FULL
OD_FINDINGS: FULL

## 2019-02-13 ASSESSMENT — DRY EYES - PHYSICIAN NOTES: OS_GENERALCOMMENTS: DRY EYE IMPROVED

## 2019-03-11 ENCOUNTER — DOCTOR'S OFFICE (OUTPATIENT)
Dept: URBAN - NONMETROPOLITAN AREA CLINIC 1 | Facility: CLINIC | Age: 69
Setting detail: OPHTHALMOLOGY
End: 2019-03-11
Payer: COMMERCIAL

## 2019-03-11 DIAGNOSIS — H35.371: ICD-10-CM

## 2019-03-11 DIAGNOSIS — H04.122: ICD-10-CM

## 2019-03-11 DIAGNOSIS — H04.121: ICD-10-CM

## 2019-03-11 DIAGNOSIS — H43.811: ICD-10-CM

## 2019-03-11 DIAGNOSIS — H40.013: ICD-10-CM

## 2019-03-11 DIAGNOSIS — H35.372: ICD-10-CM

## 2019-03-11 DIAGNOSIS — H35.373: ICD-10-CM

## 2019-03-11 DIAGNOSIS — H53.19: ICD-10-CM

## 2019-03-11 PROCEDURE — 92134 CPTRZ OPH DX IMG PST SGM RTA: CPT | Performed by: OPHTHALMOLOGY

## 2019-03-11 PROCEDURE — 83861 MICROFLUID ANALY TEARS: CPT | Performed by: OPHTHALMOLOGY

## 2019-03-11 PROCEDURE — 92014 COMPRE OPH EXAM EST PT 1/>: CPT | Performed by: OPHTHALMOLOGY

## 2019-03-11 PROCEDURE — 92226 OPHTHALMOSCOPY EXT SUBSEQUENT: CPT | Performed by: OPHTHALMOLOGY

## 2019-03-11 ASSESSMENT — REFRACTION_AUTOREFRACTION
OD_SPHERE: +0.25
OS_CYLINDER: -0.50
OD_AXIS: 180
OD_CYLINDER: 0.00
OS_SPHERE: +0.25
OS_AXIS: 079

## 2019-03-11 ASSESSMENT — REFRACTION_MANIFEST
OD_VA3: 20/
OU_VA: 20/
OU_VA: 20/
OS_AXIS: 065
OD_SPHERE: PLANO
OS_VA2: 20/
OS_CYLINDER: -0.50
OD_VA3: 20/
OD_VA2: 20/
OS_VA2: 20/20-2
OD_VA1: 20/20
OD_ADD: +2.50
OS_VA1: 20/20-2
OS_ADD: +2.50
OD_CYLINDER: SPH
OS_SPHERE: +0.50
OD_VA1: 20/
OS_VA3: 20/
OS_VA3: 20/
OD_VA2: 20/20
OS_VA1: 20/

## 2019-03-11 ASSESSMENT — CONFRONTATIONAL VISUAL FIELD TEST (CVF)
OD_FINDINGS: FULL
OS_FINDINGS: FULL

## 2019-03-11 ASSESSMENT — SPHEQUIV_DERIVED
OS_SPHEQUIV: 0
OS_SPHEQUIV: 0.25
OD_SPHEQUIV: 0.25

## 2019-03-11 ASSESSMENT — DRY EYES - PHYSICIAN NOTES: OS_GENERALCOMMENTS: DRY EYE IMPROVED

## 2019-03-11 ASSESSMENT — LID EXAM ASSESSMENTS
OS_BLEPHARITIS: LUL
OD_BLEPHARITIS: RUL

## 2019-03-11 ASSESSMENT — REFRACTION_CURRENTRX
OD_OVR_VA: 20/
OD_OVR_VA: 20/
OS_OVR_VA: 20/
OD_OVR_VA: 20/

## 2019-03-11 ASSESSMENT — VISUAL ACUITY
OD_BCVA: 20/25+2
OS_BCVA: 20/20-2

## 2019-05-23 ENCOUNTER — DOCTOR'S OFFICE (OUTPATIENT)
Dept: URBAN - NONMETROPOLITAN AREA CLINIC 1 | Facility: CLINIC | Age: 69
Setting detail: OPHTHALMOLOGY
End: 2019-05-23
Payer: COMMERCIAL

## 2019-05-23 DIAGNOSIS — H04.123: ICD-10-CM

## 2019-05-23 DIAGNOSIS — H43.811: ICD-10-CM

## 2019-05-23 DIAGNOSIS — H43.812: ICD-10-CM

## 2019-05-23 DIAGNOSIS — H35.373: ICD-10-CM

## 2019-05-23 DIAGNOSIS — H43.813: ICD-10-CM

## 2019-05-23 DIAGNOSIS — H04.121: ICD-10-CM

## 2019-05-23 DIAGNOSIS — H04.122: ICD-10-CM

## 2019-05-23 DIAGNOSIS — H40.013: ICD-10-CM

## 2019-05-23 PROCEDURE — 83861 MICROFLUID ANALY TEARS: CPT | Performed by: OPHTHALMOLOGY

## 2019-05-23 PROCEDURE — 92014 COMPRE OPH EXAM EST PT 1/>: CPT | Performed by: OPHTHALMOLOGY

## 2019-05-23 PROCEDURE — 92226 OPHTHALMOSCOPY EXT SUBSEQUENT: CPT | Performed by: OPHTHALMOLOGY

## 2019-05-23 PROCEDURE — 92134 CPTRZ OPH DX IMG PST SGM RTA: CPT | Performed by: OPHTHALMOLOGY

## 2019-05-23 ASSESSMENT — LID EXAM ASSESSMENTS
OS_BLEPHARITIS: LUL
OD_BLEPHARITIS: RUL

## 2019-05-23 ASSESSMENT — VISUAL ACUITY
OS_BCVA: 20/20
OD_BCVA: 20/25+1

## 2019-05-23 ASSESSMENT — CONFRONTATIONAL VISUAL FIELD TEST (CVF)
OS_FINDINGS: FULL
OD_FINDINGS: FULL

## 2019-05-23 ASSESSMENT — SPHEQUIV_DERIVED
OD_SPHEQUIV: 0.25
OS_SPHEQUIV: 0
OS_SPHEQUIV: 0.25

## 2019-05-23 ASSESSMENT — REFRACTION_MANIFEST
OU_VA: 20/
OS_VA3: 20/
OD_VA3: 20/
OS_VA1: 20/20-2
OD_CYLINDER: SPH
OS_VA3: 20/
OD_VA1: 20/
OS_ADD: +2.50
OS_VA2: 20/20-2
OD_SPHERE: PLANO
OD_VA3: 20/
OD_VA2: 20/
OS_SPHERE: +0.50
OD_ADD: +2.50
OD_VA2: 20/20
OS_CYLINDER: -0.50
OD_VA1: 20/20
OS_AXIS: 065
OS_VA2: 20/
OU_VA: 20/
OS_VA1: 20/

## 2019-05-23 ASSESSMENT — REFRACTION_CURRENTRX
OD_OVR_VA: 20/
OS_OVR_VA: 20/
OD_OVR_VA: 20/
OD_OVR_VA: 20/
OS_OVR_VA: 20/
OS_OVR_VA: 20/

## 2019-05-23 ASSESSMENT — REFRACTION_AUTOREFRACTION
OD_SPHERE: +0.25
OD_AXIS: 180
OS_SPHERE: +0.25
OS_AXIS: 079
OS_CYLINDER: -0.50
OD_CYLINDER: 0.00

## 2019-05-23 ASSESSMENT — DRY EYES - PHYSICIAN NOTES: OS_GENERALCOMMENTS: DRY EYE IMPROVED

## 2019-08-12 ENCOUNTER — DOCTOR'S OFFICE (OUTPATIENT)
Dept: URBAN - NONMETROPOLITAN AREA CLINIC 1 | Facility: CLINIC | Age: 69
Setting detail: OPHTHALMOLOGY
End: 2019-08-12
Payer: COMMERCIAL

## 2019-08-12 DIAGNOSIS — H35.373: ICD-10-CM

## 2019-08-12 DIAGNOSIS — H53.19: ICD-10-CM

## 2019-08-12 DIAGNOSIS — H43.811: ICD-10-CM

## 2019-08-12 DIAGNOSIS — H04.123: ICD-10-CM

## 2019-08-12 PROCEDURE — 92134 CPTRZ OPH DX IMG PST SGM RTA: CPT | Performed by: OPHTHALMOLOGY

## 2019-08-12 PROCEDURE — 92014 COMPRE OPH EXAM EST PT 1/>: CPT | Performed by: OPHTHALMOLOGY

## 2019-08-12 ASSESSMENT — CONFRONTATIONAL VISUAL FIELD TEST (CVF)
OS_FINDINGS: FULL
OD_FINDINGS: FULL

## 2019-08-12 ASSESSMENT — REFRACTION_MANIFEST
OS_AXIS: 065
OS_VA1: 20/20-2
OD_VA1: 20/20
OD_SPHERE: PLANO
OS_CYLINDER: -0.50
OS_VA3: 20/
OU_VA: 20/
OS_ADD: +2.50
OS_VA2: 20/20-2
OD_VA3: 20/
OU_VA: 20/
OD_VA1: 20/
OS_SPHERE: +0.50
OS_VA2: 20/
OD_VA2: 20/20
OD_CYLINDER: SPH
OS_VA1: 20/
OD_VA3: 20/
OD_VA2: 20/
OD_ADD: +2.50
OS_VA3: 20/

## 2019-08-12 ASSESSMENT — REFRACTION_CURRENTRX
OD_OVR_VA: 20/
OD_OVR_VA: 20/
OS_OVR_VA: 20/
OS_OVR_VA: 20/
OD_OVR_VA: 20/
OS_OVR_VA: 20/

## 2019-08-12 ASSESSMENT — REFRACTION_AUTOREFRACTION
OD_AXIS: 180
OS_SPHERE: +0.25
OS_AXIS: 079
OD_SPHERE: +0.25
OD_CYLINDER: 0.00
OS_CYLINDER: -0.50

## 2019-08-12 ASSESSMENT — SPHEQUIV_DERIVED
OS_SPHEQUIV: 0.25
OD_SPHEQUIV: 0.25
OS_SPHEQUIV: 0

## 2019-08-12 ASSESSMENT — LID EXAM ASSESSMENTS
OD_BLEPHARITIS: RUL
OS_BLEPHARITIS: LUL

## 2019-08-12 ASSESSMENT — VISUAL ACUITY
OS_BCVA: 20/20
OD_BCVA: 20/20

## 2019-08-12 ASSESSMENT — DRY EYES - PHYSICIAN NOTES: OS_GENERALCOMMENTS: DRY EYE IMPROVED

## 2019-08-28 ENCOUNTER — DOCTOR'S OFFICE (OUTPATIENT)
Dept: URBAN - NONMETROPOLITAN AREA CLINIC 1 | Facility: CLINIC | Age: 69
Setting detail: OPHTHALMOLOGY
End: 2019-08-28
Payer: COMMERCIAL

## 2019-08-28 DIAGNOSIS — H53.19: ICD-10-CM

## 2019-08-28 DIAGNOSIS — H04.123: ICD-10-CM

## 2019-08-28 DIAGNOSIS — H43.811: ICD-10-CM

## 2019-08-28 DIAGNOSIS — H35.373: ICD-10-CM

## 2019-08-28 DIAGNOSIS — H40.013: ICD-10-CM

## 2019-08-28 PROCEDURE — 92083 EXTENDED VISUAL FIELD XM: CPT | Performed by: OPHTHALMOLOGY

## 2019-08-28 PROCEDURE — 92014 COMPRE OPH EXAM EST PT 1/>: CPT | Performed by: OPHTHALMOLOGY

## 2019-08-28 PROCEDURE — 92250 FUNDUS PHOTOGRAPHY W/I&R: CPT | Performed by: OPHTHALMOLOGY

## 2019-08-28 ASSESSMENT — SPHEQUIV_DERIVED
OD_SPHEQUIV: 0.25
OS_SPHEQUIV: 0.25
OS_SPHEQUIV: 0

## 2019-08-28 ASSESSMENT — REFRACTION_CURRENTRX
OD_OVR_VA: 20/
OD_OVR_VA: 20/
OS_OVR_VA: 20/
OD_OVR_VA: 20/
OS_OVR_VA: 20/
OS_OVR_VA: 20/

## 2019-08-28 ASSESSMENT — CONFRONTATIONAL VISUAL FIELD TEST (CVF)
OS_FINDINGS: FULL
OD_FINDINGS: FULL

## 2019-08-28 ASSESSMENT — REFRACTION_MANIFEST
OD_SPHERE: PLANO
OS_VA2: 20/
OS_SPHERE: +0.50
OD_VA2: 20/20
OD_CYLINDER: SPH
OS_VA3: 20/
OS_VA3: 20/
OD_VA1: 20/
OD_VA3: 20/
OU_VA: 20/
OS_AXIS: 065
OS_VA1: 20/
OD_ADD: +2.50
OD_VA1: 20/20
OS_VA2: 20/20-2
OS_VA1: 20/20-2
OS_ADD: +2.50
OU_VA: 20/
OD_VA3: 20/
OD_VA2: 20/
OS_CYLINDER: -0.50

## 2019-08-28 ASSESSMENT — VISUAL ACUITY
OS_BCVA: 20/20-1
OD_BCVA: 20/20-2

## 2019-08-28 ASSESSMENT — REFRACTION_AUTOREFRACTION
OD_SPHERE: +0.25
OD_CYLINDER: 0.00
OS_CYLINDER: -0.50
OD_AXIS: 180
OS_SPHERE: +0.25
OS_AXIS: 079

## 2019-08-28 ASSESSMENT — LID EXAM ASSESSMENTS
OS_BLEPHARITIS: LUL
OD_BLEPHARITIS: RUL

## 2019-08-28 ASSESSMENT — DRY EYES - PHYSICIAN NOTES: OS_GENERALCOMMENTS: DRY EYE IMPROVED

## 2019-12-12 ENCOUNTER — DOCTOR'S OFFICE (OUTPATIENT)
Dept: URBAN - NONMETROPOLITAN AREA CLINIC 1 | Facility: CLINIC | Age: 69
Setting detail: OPHTHALMOLOGY
End: 2019-12-12
Payer: COMMERCIAL

## 2019-12-12 DIAGNOSIS — H43.811: ICD-10-CM

## 2019-12-12 DIAGNOSIS — H04.123: ICD-10-CM

## 2019-12-12 DIAGNOSIS — H35.373: ICD-10-CM

## 2019-12-12 DIAGNOSIS — H53.19: ICD-10-CM

## 2019-12-12 DIAGNOSIS — H04.122: ICD-10-CM

## 2019-12-12 DIAGNOSIS — H43.812: ICD-10-CM

## 2019-12-12 DIAGNOSIS — H43.813: ICD-10-CM

## 2019-12-12 DIAGNOSIS — H04.121: ICD-10-CM

## 2019-12-12 PROCEDURE — 83861 MICROFLUID ANALY TEARS: CPT | Performed by: OPHTHALMOLOGY

## 2019-12-12 PROCEDURE — 92226 OPHTHALMOSCOPY EXT SUBSEQUENT: CPT | Performed by: OPHTHALMOLOGY

## 2019-12-12 PROCEDURE — 92014 COMPRE OPH EXAM EST PT 1/>: CPT | Performed by: OPHTHALMOLOGY

## 2019-12-12 PROCEDURE — 92134 CPTRZ OPH DX IMG PST SGM RTA: CPT | Performed by: OPHTHALMOLOGY

## 2019-12-12 ASSESSMENT — REFRACTION_AUTOREFRACTION
OD_CYLINDER: 0.00
OS_CYLINDER: -0.50
OS_AXIS: 079
OD_AXIS: 180
OD_SPHERE: +0.25
OS_SPHERE: +0.25

## 2019-12-12 ASSESSMENT — REFRACTION_MANIFEST
OD_SPHERE: PLANO
OS_CYLINDER: -0.50
OS_SPHERE: +0.50
OD_VA3: 20/
OD_VA3: 20/
OD_CYLINDER: SPH
OD_ADD: +2.50
OS_VA3: 20/
OD_VA2: 20/
OS_VA3: 20/
OS_VA1: 20/20-2
OU_VA: 20/
OD_VA1: 20/20
OD_VA2: 20/20
OS_AXIS: 065
OD_VA1: 20/
OS_VA1: 20/
OU_VA: 20/
OS_VA2: 20/
OS_VA2: 20/20-2
OS_ADD: +2.50

## 2019-12-12 ASSESSMENT — VISUAL ACUITY
OD_BCVA: 20/25-2
OS_BCVA: 20/20-2

## 2019-12-12 ASSESSMENT — CONFRONTATIONAL VISUAL FIELD TEST (CVF)
OS_FINDINGS: FULL
OD_FINDINGS: FULL

## 2019-12-12 ASSESSMENT — REFRACTION_CURRENTRX
OD_OVR_VA: 20/
OS_OVR_VA: 20/

## 2019-12-12 ASSESSMENT — LID EXAM ASSESSMENTS
OD_BLEPHARITIS: RUL
OS_BLEPHARITIS: LUL

## 2019-12-12 ASSESSMENT — SPHEQUIV_DERIVED
OS_SPHEQUIV: 0
OS_SPHEQUIV: 0.25
OD_SPHEQUIV: 0.25

## 2019-12-12 ASSESSMENT — DRY EYES - PHYSICIAN NOTES: OS_GENERALCOMMENTS: DRY EYE IMPROVED

## 2020-03-04 ENCOUNTER — DOCTOR'S OFFICE (OUTPATIENT)
Dept: URBAN - NONMETROPOLITAN AREA CLINIC 1 | Facility: CLINIC | Age: 70
Setting detail: OPHTHALMOLOGY
End: 2020-03-04
Payer: COMMERCIAL

## 2020-03-04 VITALS — HEIGHT: 55 IN

## 2020-03-04 DIAGNOSIS — H40.013: ICD-10-CM

## 2020-03-04 DIAGNOSIS — H35.373: ICD-10-CM

## 2020-03-04 DIAGNOSIS — H04.123: ICD-10-CM

## 2020-03-04 DIAGNOSIS — H43.813: ICD-10-CM

## 2020-03-04 DIAGNOSIS — H04.121: ICD-10-CM

## 2020-03-04 DIAGNOSIS — H04.122: ICD-10-CM

## 2020-03-04 PROCEDURE — 92133 CPTRZD OPH DX IMG PST SGM ON: CPT | Performed by: OPHTHALMOLOGY

## 2020-03-04 PROCEDURE — 92014 COMPRE OPH EXAM EST PT 1/>: CPT | Performed by: OPHTHALMOLOGY

## 2020-03-04 PROCEDURE — 83861 MICROFLUID ANALY TEARS: CPT | Performed by: OPHTHALMOLOGY

## 2020-03-04 ASSESSMENT — DRY EYES - PHYSICIAN NOTES: OS_GENERALCOMMENTS: DRY EYE IMPROVED

## 2020-03-04 ASSESSMENT — LID EXAM ASSESSMENTS
OS_BLEPHARITIS: LUL
OD_BLEPHARITIS: RUL

## 2020-03-04 ASSESSMENT — CONFRONTATIONAL VISUAL FIELD TEST (CVF)
OS_FINDINGS: FULL
OD_FINDINGS: FULL

## 2020-06-05 ENCOUNTER — DOCTOR'S OFFICE (OUTPATIENT)
Dept: URBAN - NONMETROPOLITAN AREA CLINIC 1 | Facility: CLINIC | Age: 70
Setting detail: OPHTHALMOLOGY
End: 2020-06-05
Payer: COMMERCIAL

## 2020-06-05 DIAGNOSIS — H35.373: ICD-10-CM

## 2020-06-05 DIAGNOSIS — H43.811: ICD-10-CM

## 2020-06-05 DIAGNOSIS — H04.121: ICD-10-CM

## 2020-06-05 DIAGNOSIS — H04.123: ICD-10-CM

## 2020-06-05 DIAGNOSIS — H04.122: ICD-10-CM

## 2020-06-05 PROCEDURE — 92014 COMPRE OPH EXAM EST PT 1/>: CPT | Performed by: OPHTHALMOLOGY

## 2020-06-05 PROCEDURE — 92201 OPSCPY EXTND RTA DRAW UNI/BI: CPT | Performed by: OPHTHALMOLOGY

## 2020-06-05 PROCEDURE — 83861 MICROFLUID ANALY TEARS: CPT | Performed by: OPHTHALMOLOGY

## 2020-06-05 PROCEDURE — 92134 CPTRZ OPH DX IMG PST SGM RTA: CPT | Performed by: OPHTHALMOLOGY

## 2020-06-05 ASSESSMENT — REFRACTION_CURRENTRX
OS_OVR_VA: 20/
OD_OVR_VA: 20/

## 2020-06-05 ASSESSMENT — SPHEQUIV_DERIVED
OS_SPHEQUIV: 0.375
OS_SPHEQUIV: 0.25
OD_SPHEQUIV: 0.25

## 2020-06-05 ASSESSMENT — VISUAL ACUITY
OD_BCVA: 20/20-1
OS_BCVA: 20/20-1

## 2020-06-05 ASSESSMENT — LID EXAM ASSESSMENTS
OS_BLEPHARITIS: LUL
OD_BLEPHARITIS: RUL

## 2020-06-05 ASSESSMENT — REFRACTION_MANIFEST
OS_ADD: +2.50
OD_VA1: 20/20
OS_CYLINDER: -0.50
OS_SPHERE: +0.50
OD_ADD: +2.50
OD_CYLINDER: SPH
OD_SPHERE: PLANO
OS_VA1: 20/20-2
OD_VA2: 20/20
OS_AXIS: 065
OS_VA2: 20/20-2

## 2020-06-05 ASSESSMENT — REFRACTION_AUTOREFRACTION
OS_SPHERE: +0.75
OD_AXIS: 180
OS_CYLINDER: -0.75
OS_AXIS: 083
OD_SPHERE: +0.25
OD_CYLINDER: 0.00

## 2020-06-05 ASSESSMENT — CONFRONTATIONAL VISUAL FIELD TEST (CVF)
OS_FINDINGS: FULL
OD_FINDINGS: FULL

## 2020-06-05 ASSESSMENT — DRY EYES - PHYSICIAN NOTES: OS_GENERALCOMMENTS: DRY EYE IMPROVED

## 2020-06-12 PROBLEM — Z96.1 PRESENCE OF INTRAOCULAR LENS ; RIGHT EYE  BOTH EYES: Status: ACTIVE | Noted: 2018-08-03

## 2020-06-12 ASSESSMENT — VISUAL ACUITY
OD_BCVA: 20/20-1
OS_BCVA: 20/20

## 2020-06-12 ASSESSMENT — REFRACTION_MANIFEST
OD_ADD: +2.50
OD_VA2: 20/20
OD_VA1: 20/20
OS_SPHERE: +0.50
OS_VA2: 20/20-2
OD_SPHERE: PLANO
OS_AXIS: 065
OS_CYLINDER: -0.50
OD_CYLINDER: SPH
OS_VA1: 20/20-2
OS_ADD: +2.50

## 2020-06-12 ASSESSMENT — SPHEQUIV_DERIVED
OS_SPHEQUIV: 0.25
OS_SPHEQUIV: 0.375
OD_SPHEQUIV: 0.25

## 2020-06-12 ASSESSMENT — REFRACTION_CURRENTRX
OS_OVR_VA: 20/
OD_OVR_VA: 20/

## 2020-06-12 ASSESSMENT — REFRACTION_AUTOREFRACTION
OS_AXIS: 083
OD_SPHERE: +0.25
OD_AXIS: 180
OS_SPHERE: +0.75
OS_CYLINDER: -0.75
OD_CYLINDER: 0.00

## 2020-06-16 ENCOUNTER — DOCTOR'S OFFICE (OUTPATIENT)
Dept: URBAN - NONMETROPOLITAN AREA CLINIC 1 | Facility: CLINIC | Age: 70
Setting detail: OPHTHALMOLOGY
End: 2020-06-16
Payer: COMMERCIAL

## 2020-06-16 DIAGNOSIS — H40.013: ICD-10-CM

## 2020-06-16 PROCEDURE — 92012 INTRM OPH EXAM EST PATIENT: CPT | Performed by: OPHTHALMOLOGY

## 2020-06-16 ASSESSMENT — CONFRONTATIONAL VISUAL FIELD TEST (CVF)
OD_FINDINGS: FULL
OS_FINDINGS: FULL

## 2020-06-16 ASSESSMENT — REFRACTION_AUTOREFRACTION
OS_AXIS: 083
OD_CYLINDER: 0.00
OS_CYLINDER: -0.75
OD_SPHERE: +0.25
OD_AXIS: 180
OS_SPHERE: +0.75

## 2020-06-16 ASSESSMENT — REFRACTION_MANIFEST
OS_ADD: +2.50
OD_SPHERE: PLANO
OS_VA1: 20/20-2
OD_CYLINDER: SPH
OD_VA1: 20/20
OD_VA2: 20/20
OS_VA2: 20/20-2
OD_ADD: +2.50
OS_SPHERE: +0.50
OS_AXIS: 065
OS_CYLINDER: -0.50

## 2020-06-16 ASSESSMENT — LID EXAM ASSESSMENTS
OD_BLEPHARITIS: RUL
OS_BLEPHARITIS: LUL

## 2020-06-16 ASSESSMENT — REFRACTION_CURRENTRX
OD_OVR_VA: 20/
OS_OVR_VA: 20/

## 2020-06-16 ASSESSMENT — SPHEQUIV_DERIVED
OS_SPHEQUIV: 0.25
OD_SPHEQUIV: 0.25
OS_SPHEQUIV: 0.375

## 2020-06-16 ASSESSMENT — VISUAL ACUITY
OS_BCVA: 20/20-2
OD_BCVA: 20/20-1

## 2020-06-16 ASSESSMENT — DRY EYES - PHYSICIAN NOTES: OS_GENERALCOMMENTS: DRY EYE IMPROVED

## 2020-06-24 ENCOUNTER — APPOINTMENT (EMERGENCY)
Dept: CT IMAGING | Facility: HOSPITAL | Age: 70
End: 2020-06-24
Payer: MEDICARE

## 2020-06-24 ENCOUNTER — HOSPITAL ENCOUNTER (EMERGENCY)
Facility: HOSPITAL | Age: 70
Discharge: HOME | End: 2020-06-24
Attending: EMERGENCY MEDICINE | Admitting: EMERGENCY MEDICINE
Payer: MEDICARE

## 2020-06-24 ENCOUNTER — APPOINTMENT (EMERGENCY)
Dept: RADIOLOGY | Facility: HOSPITAL | Age: 70
End: 2020-06-24
Payer: MEDICARE

## 2020-06-24 VITALS
OXYGEN SATURATION: 100 % | SYSTOLIC BLOOD PRESSURE: 170 MMHG | HEART RATE: 86 BPM | TEMPERATURE: 98.1 F | DIASTOLIC BLOOD PRESSURE: 82 MMHG | WEIGHT: 194.45 LBS | RESPIRATION RATE: 18 BRPM

## 2020-06-24 DIAGNOSIS — S09.90XA CLOSED HEAD INJURY, INITIAL ENCOUNTER: ICD-10-CM
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
## 2020-06-24 DIAGNOSIS — T07.XXXA ABRASIONS OF MULTIPLE SITES: ICD-10-CM

## 2020-06-24 PROCEDURE — 70450 CT HEAD/BRAIN W/O DYE: CPT

## 2020-06-24 PROCEDURE — 70486 CT MAXILLOFACIAL W/O DYE: CPT

## 2020-06-24 PROCEDURE — 72125 CT NECK SPINE W/O DYE: CPT

## 2020-06-24 PROCEDURE — 73090 X-RAY EXAM OF FOREARM: CPT

## 2020-06-24 PROCEDURE — 99284 EMERGENCY DEPT VISIT MOD MDM: CPT

## 2020-06-24 PROCEDURE — 99284 EMERGENCY DEPT VISIT MOD MDM: CPT | Performed by: EMERGENCY MEDICINE

## 2020-06-24 RX ORDER — DOXYCYCLINE HYCLATE 100 MG/1
100 CAPSULE ORAL ONCE
Status: COMPLETED | OUTPATIENT
Start: 2020-06-24 | End: 2020-06-24

## 2020-06-24 RX ORDER — DOXYCYCLINE HYCLATE 100 MG/1
100 CAPSULE ORAL 2 TIMES DAILY
Qty: 14 CAPSULE | Refills: 0 | Status: SHIPPED | OUTPATIENT
Start: 2020-06-24 | End: 2020-07-01

## 2020-06-24 RX ORDER — CLINDAMYCIN HYDROCHLORIDE 150 MG/1
300 CAPSULE ORAL ONCE
Status: COMPLETED | OUTPATIENT
Start: 2020-06-24 | End: 2020-06-24

## 2020-06-24 RX ORDER — ONDANSETRON 4 MG/1
4 TABLET, ORALLY DISINTEGRATING ORAL ONCE
Status: COMPLETED | OUTPATIENT
Start: 2020-06-24 | End: 2020-06-24

## 2020-06-24 RX ORDER — CLINDAMYCIN HYDROCHLORIDE 300 MG/1
300 CAPSULE ORAL 3 TIMES DAILY
Qty: 21 CAPSULE | Refills: 0 | Status: SHIPPED | OUTPATIENT
Start: 2020-06-24 | End: 2020-07-01

## 2020-06-24 RX ADMIN — CLINDAMYCIN HYDROCHLORIDE 300 MG: 150 CAPSULE ORAL at 15:51

## 2020-06-24 RX ADMIN — ONDANSETRON 4 MG: 4 TABLET, ORALLY DISINTEGRATING ORAL at 15:24

## 2020-06-24 RX ADMIN — DOXYCYCLINE HYCLATE 100 MG: 100 CAPSULE ORAL at 15:51

## 2021-01-08 ENCOUNTER — DOCTOR'S OFFICE (OUTPATIENT)
Dept: URBAN - NONMETROPOLITAN AREA CLINIC 1 | Facility: CLINIC | Age: 71
Setting detail: OPHTHALMOLOGY
End: 2021-01-08
Payer: COMMERCIAL

## 2021-01-08 VITALS — HEIGHT: 55 IN

## 2021-01-08 DIAGNOSIS — H43.811: ICD-10-CM

## 2021-01-08 DIAGNOSIS — H04.122: ICD-10-CM

## 2021-01-08 DIAGNOSIS — H40.013: ICD-10-CM

## 2021-01-08 DIAGNOSIS — H04.121: ICD-10-CM

## 2021-01-08 DIAGNOSIS — H35.373: ICD-10-CM

## 2021-01-08 PROCEDURE — 92250 FUNDUS PHOTOGRAPHY W/I&R: CPT | Performed by: OPHTHALMOLOGY

## 2021-01-08 PROCEDURE — 76514 ECHO EXAM OF EYE THICKNESS: CPT | Performed by: OPHTHALMOLOGY

## 2021-01-08 PROCEDURE — 92083 EXTENDED VISUAL FIELD XM: CPT | Performed by: OPHTHALMOLOGY

## 2021-01-08 PROCEDURE — 92014 COMPRE OPH EXAM EST PT 1/>: CPT | Performed by: OPHTHALMOLOGY

## 2021-01-08 ASSESSMENT — CONFRONTATIONAL VISUAL FIELD TEST (CVF)
OD_FINDINGS: FULL
OS_FINDINGS: FULL

## 2021-01-08 ASSESSMENT — SPHEQUIV_DERIVED
OS_SPHEQUIV: 0.375
OS_SPHEQUIV: 0.25
OD_SPHEQUIV: 0.25

## 2021-01-08 ASSESSMENT — REFRACTION_MANIFEST
OS_CYLINDER: -0.50
OD_ADD: +2.50
OS_SPHERE: +0.50
OD_CYLINDER: SPH
OD_VA1: 20/20
OS_VA1: 20/20-2
OS_VA2: 20/20-2
OS_AXIS: 065
OS_ADD: +2.50
OD_VA2: 20/20
OD_SPHERE: PLANO

## 2021-01-08 ASSESSMENT — REFRACTION_CURRENTRX
OD_OVR_VA: 20/
OS_OVR_VA: 20/

## 2021-01-08 ASSESSMENT — TONOMETRY
OD_IOP_MMHG: 20
OS_IOP_MMHG: 20

## 2021-01-08 ASSESSMENT — VISUAL ACUITY
OS_BCVA: 20/20-2
OD_BCVA: 20/25+2

## 2021-01-08 ASSESSMENT — REFRACTION_AUTOREFRACTION
OD_AXIS: 180
OD_SPHERE: +0.25
OD_CYLINDER: 0.00
OS_SPHERE: +0.75
OS_AXIS: 083
OS_CYLINDER: -0.75

## 2021-01-08 ASSESSMENT — PACHYMETRY
OS_CT_UM: 660
OD_CT_CORRECTION: >-7
OD_CT_UM: 670
OS_CT_CORRECTION: >-7

## 2021-01-08 ASSESSMENT — LID EXAM ASSESSMENTS
OD_BLEPHARITIS: RUL
OS_BLEPHARITIS: LUL

## 2021-01-08 ASSESSMENT — DRY EYES - PHYSICIAN NOTES: OS_GENERALCOMMENTS: DRY EYE IMPROVED

## 2021-02-25 ENCOUNTER — DOCTOR'S OFFICE (OUTPATIENT)
Dept: URBAN - NONMETROPOLITAN AREA CLINIC 1 | Facility: CLINIC | Age: 71
Setting detail: OPHTHALMOLOGY
End: 2021-02-25
Payer: COMMERCIAL

## 2021-02-25 DIAGNOSIS — H04.123: ICD-10-CM

## 2021-02-25 DIAGNOSIS — H43.811: ICD-10-CM

## 2021-02-25 DIAGNOSIS — H35.373: ICD-10-CM

## 2021-02-25 DIAGNOSIS — H04.121: ICD-10-CM

## 2021-02-25 DIAGNOSIS — H04.122: ICD-10-CM

## 2021-02-25 DIAGNOSIS — H43.822: ICD-10-CM

## 2021-02-25 PROCEDURE — 83861 MICROFLUID ANALY TEARS: CPT | Performed by: OPHTHALMOLOGY

## 2021-02-25 PROCEDURE — 92201 OPSCPY EXTND RTA DRAW UNI/BI: CPT | Performed by: OPHTHALMOLOGY

## 2021-02-25 PROCEDURE — 92014 COMPRE OPH EXAM EST PT 1/>: CPT | Performed by: OPHTHALMOLOGY

## 2021-02-25 PROCEDURE — 92134 CPTRZ OPH DX IMG PST SGM RTA: CPT | Performed by: OPHTHALMOLOGY

## 2021-02-25 ASSESSMENT — SPHEQUIV_DERIVED
OD_SPHEQUIV: 0.25
OS_SPHEQUIV: 0.25
OS_SPHEQUIV: 0.375

## 2021-02-25 ASSESSMENT — REFRACTION_MANIFEST
OD_CYLINDER: SPH
OS_VA1: 20/20-2
OS_SPHERE: +0.50
OS_ADD: +2.50
OS_CYLINDER: -0.50
OD_SPHERE: PLANO
OD_VA2: 20/20
OD_VA1: 20/20
OS_AXIS: 065
OS_VA2: 20/20-2
OD_ADD: +2.50

## 2021-02-25 ASSESSMENT — DRY EYES - PHYSICIAN NOTES: OS_GENERALCOMMENTS: DRY EYE IMPROVED

## 2021-02-25 ASSESSMENT — LID EXAM ASSESSMENTS
OS_BLEPHARITIS: LUL
OD_BLEPHARITIS: RUL

## 2021-02-25 ASSESSMENT — CONFRONTATIONAL VISUAL FIELD TEST (CVF)
OD_FINDINGS: FULL
OS_FINDINGS: FULL

## 2021-02-25 ASSESSMENT — REFRACTION_AUTOREFRACTION
OS_CYLINDER: -0.75
OD_CYLINDER: 0.00
OS_AXIS: 083
OS_SPHERE: +0.75
OD_SPHERE: +0.25
OD_AXIS: 180

## 2021-02-25 ASSESSMENT — VISUAL ACUITY
OD_BCVA: 20/25-1
OS_BCVA: 20/20-1

## 2021-02-25 ASSESSMENT — REFRACTION_CURRENTRX
OS_OVR_VA: 20/
OD_OVR_VA: 20/

## 2021-03-09 DIAGNOSIS — Z23 ENCOUNTER FOR IMMUNIZATION: ICD-10-CM

## 2021-03-11 ENCOUNTER — DOCTOR'S OFFICE (OUTPATIENT)
Dept: URBAN - NONMETROPOLITAN AREA CLINIC 1 | Facility: CLINIC | Age: 71
Setting detail: OPHTHALMOLOGY
End: 2021-03-11
Payer: COMMERCIAL

## 2021-03-11 DIAGNOSIS — H43.822: ICD-10-CM

## 2021-03-11 DIAGNOSIS — H43.811: ICD-10-CM

## 2021-03-11 DIAGNOSIS — H35.373: ICD-10-CM

## 2021-03-11 PROCEDURE — 92250 FUNDUS PHOTOGRAPHY W/I&R: CPT | Performed by: OPHTHALMOLOGY

## 2021-03-11 PROCEDURE — 92014 COMPRE OPH EXAM EST PT 1/>: CPT | Performed by: OPHTHALMOLOGY

## 2021-03-11 PROCEDURE — 92235 FLUORESCEIN ANGRPH MLTIFRAME: CPT | Performed by: OPHTHALMOLOGY

## 2021-03-11 ASSESSMENT — REFRACTION_MANIFEST
OS_VA2: 20/20-2
OD_VA2: 20/20
OS_AXIS: 065
OD_SPHERE: PLANO
OS_ADD: +2.50
OD_VA1: 20/20
OD_ADD: +2.50
OD_CYLINDER: SPH
OS_VA1: 20/20-2
OS_CYLINDER: -0.50
OS_SPHERE: +0.50

## 2021-03-11 ASSESSMENT — REFRACTION_AUTOREFRACTION
OS_SPHERE: +0.75
OD_SPHERE: +0.25
OD_AXIS: 180
OD_CYLINDER: 0.00
OS_CYLINDER: -0.75
OS_AXIS: 083

## 2021-03-11 ASSESSMENT — VISUAL ACUITY
OS_BCVA: 20/20
OD_BCVA: 20/20-2

## 2021-03-11 ASSESSMENT — CONFRONTATIONAL VISUAL FIELD TEST (CVF)
OD_FINDINGS: FULL
OS_FINDINGS: FULL

## 2021-03-11 ASSESSMENT — REFRACTION_CURRENTRX
OD_OVR_VA: 20/
OS_OVR_VA: 20/

## 2021-03-11 ASSESSMENT — DRY EYES - PHYSICIAN NOTES: OS_GENERALCOMMENTS: DRY EYE IMPROVED

## 2021-03-11 ASSESSMENT — SPHEQUIV_DERIVED
OS_SPHEQUIV: 0.375
OD_SPHEQUIV: 0.25
OS_SPHEQUIV: 0.25

## 2021-04-08 ENCOUNTER — DOCTOR'S OFFICE (OUTPATIENT)
Dept: URBAN - NONMETROPOLITAN AREA CLINIC 1 | Facility: CLINIC | Age: 71
Setting detail: OPHTHALMOLOGY
End: 2021-04-08
Payer: COMMERCIAL

## 2021-04-08 DIAGNOSIS — H04.122: ICD-10-CM

## 2021-04-08 DIAGNOSIS — H43.811: ICD-10-CM

## 2021-04-08 DIAGNOSIS — H04.121: ICD-10-CM

## 2021-04-08 DIAGNOSIS — H43.822: ICD-10-CM

## 2021-04-08 DIAGNOSIS — H40.013: ICD-10-CM

## 2021-04-08 DIAGNOSIS — H04.123: ICD-10-CM

## 2021-04-08 DIAGNOSIS — H35.373: ICD-10-CM

## 2021-04-08 PROCEDURE — 92201 OPSCPY EXTND RTA DRAW UNI/BI: CPT | Performed by: OPHTHALMOLOGY

## 2021-04-08 PROCEDURE — 83861 MICROFLUID ANALY TEARS: CPT | Performed by: OPHTHALMOLOGY

## 2021-04-08 PROCEDURE — 92134 CPTRZ OPH DX IMG PST SGM RTA: CPT | Performed by: OPHTHALMOLOGY

## 2021-04-08 PROCEDURE — 92014 COMPRE OPH EXAM EST PT 1/>: CPT | Performed by: OPHTHALMOLOGY

## 2021-04-08 ASSESSMENT — REFRACTION_AUTOREFRACTION
OS_SPHERE: +0.75
OS_AXIS: 083
OS_CYLINDER: -0.75
OD_AXIS: 180
OD_CYLINDER: 0.00
OD_SPHERE: +0.25

## 2021-04-08 ASSESSMENT — CONFRONTATIONAL VISUAL FIELD TEST (CVF)
OD_FINDINGS: FULL
OS_FINDINGS: FULL

## 2021-04-08 ASSESSMENT — REFRACTION_CURRENTRX
OS_OVR_VA: 20/
OD_OVR_VA: 20/

## 2021-04-08 ASSESSMENT — REFRACTION_MANIFEST
OS_ADD: +2.50
OD_CYLINDER: SPH
OD_SPHERE: PLANO
OS_CYLINDER: -0.50
OD_VA2: 20/20
OS_SPHERE: +0.50
OD_VA1: 20/20
OS_AXIS: 065
OD_ADD: +2.50
OS_VA1: 20/20-2
OS_VA2: 20/20-2

## 2021-04-08 ASSESSMENT — SPHEQUIV_DERIVED
OS_SPHEQUIV: 0.375
OS_SPHEQUIV: 0.25
OD_SPHEQUIV: 0.25

## 2021-04-08 ASSESSMENT — VISUAL ACUITY
OS_BCVA: 20/20-1
OD_BCVA: 20/20-2

## 2021-04-08 ASSESSMENT — DRY EYES - PHYSICIAN NOTES: OS_GENERALCOMMENTS: DRY EYE IMPROVED

## 2021-06-03 ENCOUNTER — DOCTOR'S OFFICE (OUTPATIENT)
Dept: URBAN - NONMETROPOLITAN AREA CLINIC 1 | Facility: CLINIC | Age: 71
Setting detail: OPHTHALMOLOGY
End: 2021-06-03
Payer: COMMERCIAL

## 2021-06-03 DIAGNOSIS — H04.121: ICD-10-CM

## 2021-06-03 DIAGNOSIS — H04.122: ICD-10-CM

## 2021-06-03 DIAGNOSIS — H40.013: ICD-10-CM

## 2021-06-03 DIAGNOSIS — H35.373: ICD-10-CM

## 2021-06-03 DIAGNOSIS — H43.811: ICD-10-CM

## 2021-06-03 DIAGNOSIS — H43.822: ICD-10-CM

## 2021-06-03 PROCEDURE — 83861 MICROFLUID ANALY TEARS: CPT | Performed by: OPHTHALMOLOGY

## 2021-06-03 PROCEDURE — 92201 OPSCPY EXTND RTA DRAW UNI/BI: CPT | Performed by: OPHTHALMOLOGY

## 2021-06-03 PROCEDURE — 92014 COMPRE OPH EXAM EST PT 1/>: CPT | Performed by: OPHTHALMOLOGY

## 2021-06-03 PROCEDURE — 92134 CPTRZ OPH DX IMG PST SGM RTA: CPT | Performed by: OPHTHALMOLOGY

## 2021-06-03 ASSESSMENT — REFRACTION_MANIFEST
OS_ADD: +2.50
OS_VA1: 20/20-2
OD_CYLINDER: SPH
OS_AXIS: 065
OS_CYLINDER: -0.50
OD_VA2: 20/20
OD_SPHERE: PLANO
OS_VA2: 20/20-2
OD_VA1: 20/20
OD_ADD: +2.50
OS_SPHERE: +0.50

## 2021-06-03 ASSESSMENT — REFRACTION_CURRENTRX
OS_OVR_VA: 20/
OD_OVR_VA: 20/

## 2021-06-03 ASSESSMENT — REFRACTION_AUTOREFRACTION
OD_SPHERE: +0.25
OS_SPHERE: +0.75
OS_AXIS: 083
OD_AXIS: 180
OD_CYLINDER: 0.00
OS_CYLINDER: -0.75

## 2021-06-03 ASSESSMENT — CONFRONTATIONAL VISUAL FIELD TEST (CVF)
OS_FINDINGS: FULL
OD_FINDINGS: FULL

## 2021-06-03 ASSESSMENT — VISUAL ACUITY
OD_BCVA: 20/20-2
OS_BCVA: 20/20-1

## 2021-06-03 ASSESSMENT — SPHEQUIV_DERIVED
OD_SPHEQUIV: 0.25
OS_SPHEQUIV: 0.25
OS_SPHEQUIV: 0.375

## 2021-06-03 ASSESSMENT — DRY EYES - PHYSICIAN NOTES: OS_GENERALCOMMENTS: DRY EYE IMPROVED

## 2021-07-21 ENCOUNTER — DOCTOR'S OFFICE (OUTPATIENT)
Dept: URBAN - NONMETROPOLITAN AREA CLINIC 1 | Facility: CLINIC | Age: 71
Setting detail: OPHTHALMOLOGY
End: 2021-07-21
Payer: COMMERCIAL

## 2021-07-21 DIAGNOSIS — H43.811: ICD-10-CM

## 2021-07-21 DIAGNOSIS — H04.122: ICD-10-CM

## 2021-07-21 DIAGNOSIS — H04.123: ICD-10-CM

## 2021-07-21 DIAGNOSIS — H04.121: ICD-10-CM

## 2021-07-21 DIAGNOSIS — H40.013: ICD-10-CM

## 2021-07-21 DIAGNOSIS — H35.373: ICD-10-CM

## 2021-07-21 DIAGNOSIS — H43.822: ICD-10-CM

## 2021-07-21 PROCEDURE — 92014 COMPRE OPH EXAM EST PT 1/>: CPT | Performed by: OPHTHALMOLOGY

## 2021-07-21 PROCEDURE — 92133 CPTRZD OPH DX IMG PST SGM ON: CPT | Performed by: OPHTHALMOLOGY

## 2021-07-21 ASSESSMENT — REFRACTION_AUTOREFRACTION
OD_SPHERE: +0.25
OD_CYLINDER: 0.00
OS_SPHERE: +0.75
OD_AXIS: 180
OS_CYLINDER: -0.75
OS_AXIS: 083

## 2021-07-21 ASSESSMENT — REFRACTION_MANIFEST
OS_AXIS: 065
OD_SPHERE: PLANO
OD_CYLINDER: SPH
OS_SPHERE: +0.50
OD_ADD: +2.50
OS_ADD: +2.50
OD_VA1: 20/20
OS_VA1: 20/20-2
OS_CYLINDER: -0.50
OS_VA2: 20/20-2
OD_VA2: 20/20

## 2021-07-21 ASSESSMENT — REFRACTION_CURRENTRX
OS_OVR_VA: 20/
OD_OVR_VA: 20/

## 2021-07-21 ASSESSMENT — SPHEQUIV_DERIVED
OS_SPHEQUIV: 0.375
OS_SPHEQUIV: 0.25
OD_SPHEQUIV: 0.25

## 2021-07-21 ASSESSMENT — TONOMETRY
OS_IOP_MMHG: 20
OD_IOP_MMHG: 19

## 2021-07-21 ASSESSMENT — VISUAL ACUITY
OS_BCVA: 20/25+2
OD_BCVA: 20/20-2

## 2021-07-21 ASSESSMENT — CONFRONTATIONAL VISUAL FIELD TEST (CVF)
OD_FINDINGS: FULL
OS_FINDINGS: FULL

## 2021-07-21 ASSESSMENT — DRY EYES - PHYSICIAN NOTES: OS_GENERALCOMMENTS: DRY EYE IMPROVED

## 2021-08-17 ENCOUNTER — AMBUL SURGICAL CARE (OUTPATIENT)
Dept: URBAN - NONMETROPOLITAN AREA SURGERY 1 | Facility: SURGERY | Age: 71
Setting detail: OPHTHALMOLOGY
End: 2021-08-17
Payer: COMMERCIAL

## 2021-08-17 DIAGNOSIS — H26.491: ICD-10-CM

## 2021-08-17 PROBLEM — H26.40 PCO ; RIGHT EYE: Status: ACTIVE | Noted: 2021-08-17

## 2021-08-17 PROCEDURE — G8918 PT W/O PREOP ORDER IV AB PRO: HCPCS | Performed by: CLINIC/CENTER

## 2021-08-17 PROCEDURE — G8907 PT DOC NO EVENTS ON DISCHARG: HCPCS | Performed by: OPHTHALMOLOGY

## 2021-08-17 PROCEDURE — 66821 AFTER CATARACT LASER SURGERY: CPT | Performed by: OPHTHALMOLOGY

## 2021-08-17 PROCEDURE — G8918 PT W/O PREOP ORDER IV AB PRO: HCPCS | Performed by: OPHTHALMOLOGY

## 2021-08-17 PROCEDURE — G8907 PT DOC NO EVENTS ON DISCHARG: HCPCS | Performed by: CLINIC/CENTER

## 2021-08-17 PROCEDURE — 66821 AFTER CATARACT LASER SURGERY: CPT | Performed by: CLINIC/CENTER

## 2021-11-16 ENCOUNTER — DOCTOR'S OFFICE (OUTPATIENT)
Dept: URBAN - NONMETROPOLITAN AREA CLINIC 1 | Facility: CLINIC | Age: 71
Setting detail: OPHTHALMOLOGY
End: 2021-11-16
Payer: COMMERCIAL

## 2021-11-16 VITALS — HEIGHT: 55 IN

## 2021-11-16 DIAGNOSIS — H43.822: ICD-10-CM

## 2021-11-16 DIAGNOSIS — H40.013: ICD-10-CM

## 2021-11-16 DIAGNOSIS — H35.373: ICD-10-CM

## 2021-11-16 DIAGNOSIS — H04.121: ICD-10-CM

## 2021-11-16 DIAGNOSIS — H43.811: ICD-10-CM

## 2021-11-16 DIAGNOSIS — H04.122: ICD-10-CM

## 2021-11-16 PROCEDURE — 99213 OFFICE O/P EST LOW 20 MIN: CPT | Performed by: OPHTHALMOLOGY

## 2021-11-16 PROCEDURE — 92134 CPTRZ OPH DX IMG PST SGM RTA: CPT | Performed by: OPHTHALMOLOGY

## 2021-11-16 ASSESSMENT — CONFRONTATIONAL VISUAL FIELD TEST (CVF)
OD_FINDINGS: FULL
OS_FINDINGS: FULL

## 2021-11-16 ASSESSMENT — DRY EYES - PHYSICIAN NOTES: OS_GENERALCOMMENTS: DRY EYE IMPROVED

## 2021-11-19 ASSESSMENT — REFRACTION_MANIFEST
OS_CYLINDER: -0.50
OS_SPHERE: +0.50
OD_CYLINDER: SPH
OS_AXIS: 065
OD_VA2: 20/20
OS_VA2: 20/20-2
OS_VA1: 20/20-2
OD_SPHERE: PLANO
OD_VA1: 20/20
OD_ADD: +2.50
OS_ADD: +2.50

## 2021-11-19 ASSESSMENT — REFRACTION_CURRENTRX
OD_OVR_VA: 20/
OS_OVR_VA: 20/

## 2021-11-19 ASSESSMENT — SPHEQUIV_DERIVED
OD_SPHEQUIV: 0.5
OS_SPHEQUIV: 0.375
OS_SPHEQUIV: 0.25

## 2021-11-19 ASSESSMENT — REFRACTION_AUTOREFRACTION
OS_AXIS: 077
OD_CYLINDER: -0.50
OD_SPHERE: +0.75
OS_CYLINDER: -0.75
OD_AXIS: 143
OS_SPHERE: +0.75

## 2021-11-19 ASSESSMENT — VISUAL ACUITY
OD_BCVA: 20/20-1
OS_BCVA: 20/20-1

## 2022-05-17 ENCOUNTER — DOCTOR'S OFFICE (OUTPATIENT)
Dept: URBAN - NONMETROPOLITAN AREA CLINIC 1 | Facility: CLINIC | Age: 72
Setting detail: OPHTHALMOLOGY
End: 2022-05-17
Payer: COMMERCIAL

## 2022-05-17 DIAGNOSIS — H43.811: ICD-10-CM

## 2022-05-17 DIAGNOSIS — H04.122: ICD-10-CM

## 2022-05-17 DIAGNOSIS — H43.822: ICD-10-CM

## 2022-05-17 DIAGNOSIS — H35.373: ICD-10-CM

## 2022-05-17 DIAGNOSIS — H26.492: ICD-10-CM

## 2022-05-17 DIAGNOSIS — H04.121: ICD-10-CM

## 2022-05-17 DIAGNOSIS — H40.013: ICD-10-CM

## 2022-05-17 PROCEDURE — 76514 ECHO EXAM OF EYE THICKNESS: CPT | Performed by: OPHTHALMOLOGY

## 2022-05-17 PROCEDURE — 92083 EXTENDED VISUAL FIELD XM: CPT | Performed by: OPHTHALMOLOGY

## 2022-05-17 PROCEDURE — 99214 OFFICE O/P EST MOD 30 MIN: CPT | Performed by: OPHTHALMOLOGY

## 2022-05-17 PROCEDURE — 92134 CPTRZ OPH DX IMG PST SGM RTA: CPT | Performed by: OPHTHALMOLOGY

## 2022-05-17 ASSESSMENT — REFRACTION_MANIFEST
OS_ADD: +2.50
OD_CYLINDER: SPH
OD_VA1: 20/20
OS_AXIS: 065
OS_CYLINDER: -0.50
OS_VA2: 20/20-2
OD_SPHERE: PLANO
OS_VA1: 20/20-2
OD_VA2: 20/20
OD_ADD: +2.50
OS_SPHERE: +0.50

## 2022-05-17 ASSESSMENT — REFRACTION_CURRENTRX
OS_OVR_VA: 20/
OD_OVR_VA: 20/

## 2022-05-17 ASSESSMENT — TONOMETRY
OS_IOP_MMHG: 19
OD_IOP_MMHG: 18

## 2022-05-17 ASSESSMENT — VISUAL ACUITY
OD_BCVA: 20/20-2
OS_BCVA: 20/20-1

## 2022-05-17 ASSESSMENT — CONFRONTATIONAL VISUAL FIELD TEST (CVF)
OD_FINDINGS: FULL
OS_FINDINGS: FULL

## 2022-05-17 ASSESSMENT — REFRACTION_AUTOREFRACTION
OS_SPHERE: +0.50
OS_CYLINDER: -0.50
OD_SPHERE: +0.50
OD_CYLINDER: -0.25
OD_AXIS: 135
OS_AXIS: 076

## 2022-05-17 ASSESSMENT — PACHYMETRY
OD_CT_UM: 670
OD_CT_CORRECTION: >-7
OS_CT_UM: 660
OS_CT_CORRECTION: >-7

## 2022-05-17 ASSESSMENT — SPHEQUIV_DERIVED
OS_SPHEQUIV: 0.25
OD_SPHEQUIV: 0.375
OS_SPHEQUIV: 0.25

## 2022-05-17 ASSESSMENT — DRY EYES - PHYSICIAN NOTES: OS_GENERALCOMMENTS: DRY EYE IMPROVED

## 2022-06-07 ENCOUNTER — AMBUL SURGICAL CARE (OUTPATIENT)
Dept: URBAN - NONMETROPOLITAN AREA SURGERY 1 | Facility: SURGERY | Age: 72
Setting detail: OPHTHALMOLOGY
End: 2022-06-07
Payer: COMMERCIAL

## 2022-06-07 DIAGNOSIS — H26.492: ICD-10-CM

## 2022-06-07 PROBLEM — H04.123 DRY EYE; RIGHT EYE, LEFT EYE, BOTH EYES: Status: ACTIVE | Noted: 2018-06-12

## 2022-06-07 PROBLEM — H35.373 MACULAR PUCKER; BOTH EYES: Status: ACTIVE | Noted: 2019-03-11

## 2022-06-07 PROBLEM — H40.013 GLAUCOMA SUSPECT, LOW RISK; BOTH EYES: Status: ACTIVE | Noted: 2018-06-12

## 2022-06-07 PROBLEM — H53.19 PHOTOPSIA: Status: ACTIVE | Noted: 2019-01-18

## 2022-06-07 PROBLEM — H04.121 DRY EYE; RIGHT EYE, LEFT EYE, BOTH EYES: Status: ACTIVE | Noted: 2018-06-12

## 2022-06-07 PROBLEM — H43.811 POSTERIOR VITREOUS DETACHMENT;  , RIGHT EYE: Status: ACTIVE | Noted: 2020-06-16

## 2022-06-07 PROBLEM — H43.822 VITREOMACULAR ADHESION ; LEFT EYE: Status: ACTIVE | Noted: 2021-02-25

## 2022-06-07 PROBLEM — H04.122 DRY EYE; RIGHT EYE, LEFT EYE, BOTH EYES: Status: ACTIVE | Noted: 2018-06-12

## 2022-06-07 PROCEDURE — G8918 PT W/O PREOP ORDER IV AB PRO: HCPCS | Performed by: CLINIC/CENTER

## 2022-06-07 PROCEDURE — G8918 PT W/O PREOP ORDER IV AB PRO: HCPCS | Performed by: OPHTHALMOLOGY

## 2022-06-07 PROCEDURE — 66821 AFTER CATARACT LASER SURGERY: CPT | Performed by: OPHTHALMOLOGY

## 2022-06-07 PROCEDURE — 66821 AFTER CATARACT LASER SURGERY: CPT | Performed by: CLINIC/CENTER

## 2022-06-07 PROCEDURE — G8907 PT DOC NO EVENTS ON DISCHARG: HCPCS | Performed by: CLINIC/CENTER

## 2022-06-07 PROCEDURE — G8907 PT DOC NO EVENTS ON DISCHARG: HCPCS | Performed by: OPHTHALMOLOGY

## 2022-11-15 ENCOUNTER — DOCTOR'S OFFICE (OUTPATIENT)
Dept: URBAN - NONMETROPOLITAN AREA CLINIC 1 | Facility: CLINIC | Age: 72
Setting detail: OPHTHALMOLOGY
End: 2022-11-15
Payer: COMMERCIAL

## 2022-11-15 DIAGNOSIS — H04.121: ICD-10-CM

## 2022-11-15 DIAGNOSIS — H26.492: ICD-10-CM

## 2022-11-15 DIAGNOSIS — H43.811: ICD-10-CM

## 2022-11-15 DIAGNOSIS — H40.013: ICD-10-CM

## 2022-11-15 DIAGNOSIS — H35.373: ICD-10-CM

## 2022-11-15 DIAGNOSIS — H43.822: ICD-10-CM

## 2022-11-15 DIAGNOSIS — H04.122: ICD-10-CM

## 2022-11-15 PROCEDURE — 99214 OFFICE O/P EST MOD 30 MIN: CPT | Performed by: OPHTHALMOLOGY

## 2022-11-15 PROCEDURE — 92133 CPTRZD OPH DX IMG PST SGM ON: CPT | Performed by: OPHTHALMOLOGY

## 2022-11-15 ASSESSMENT — REFRACTION_MANIFEST
OD_SPHERE: PLANO
OS_SPHERE: +0.50
OD_VA1: 20/20
OS_AXIS: 065
OD_VA2: 20/20
OS_ADD: +2.50
OS_VA1: 20/20-2
OS_CYLINDER: -0.50
OS_VA2: 20/20-2
OD_ADD: +2.50
OD_CYLINDER: SPH

## 2022-11-15 ASSESSMENT — SPHEQUIV_DERIVED
OD_SPHEQUIV: 0.375
OS_SPHEQUIV: 0.25
OS_SPHEQUIV: 0.25

## 2022-11-15 ASSESSMENT — REFRACTION_AUTOREFRACTION
OS_AXIS: 076
OD_CYLINDER: -0.25
OD_AXIS: 135
OS_SPHERE: +0.50
OD_SPHERE: +0.50
OS_CYLINDER: -0.50

## 2022-11-15 ASSESSMENT — REFRACTION_CURRENTRX
OD_OVR_VA: 20/
OS_OVR_VA: 20/

## 2022-11-15 ASSESSMENT — VISUAL ACUITY
OS_BCVA: 20/20-1
OD_BCVA: 20/20-1

## 2022-11-15 ASSESSMENT — DRY EYES - PHYSICIAN NOTES: OS_GENERALCOMMENTS: DRY EYE IMPROVED

## 2022-11-15 ASSESSMENT — CONFRONTATIONAL VISUAL FIELD TEST (CVF)
OS_FINDINGS: FULL
OD_FINDINGS: FULL

## 2023-01-03 ENCOUNTER — RX ONLY (RX ONLY)
Age: 73
End: 2023-01-03

## 2023-01-03 ENCOUNTER — DOCTOR'S OFFICE (OUTPATIENT)
Dept: URBAN - NONMETROPOLITAN AREA CLINIC 1 | Facility: CLINIC | Age: 73
Setting detail: OPHTHALMOLOGY
End: 2023-01-03
Payer: COMMERCIAL

## 2023-01-03 DIAGNOSIS — H26.492: ICD-10-CM

## 2023-01-03 DIAGNOSIS — H40.013: ICD-10-CM

## 2023-01-03 PROCEDURE — 92014 COMPRE OPH EXAM EST PT 1/>: CPT | Performed by: OPHTHALMOLOGY

## 2023-01-03 PROCEDURE — 92133 CPTRZD OPH DX IMG PST SGM ON: CPT | Performed by: OPHTHALMOLOGY

## 2023-01-03 ASSESSMENT — REFRACTION_MANIFEST
OS_CYLINDER: -0.50
OS_SPHERE: +0.50
OD_CYLINDER: SPH
OD_SPHERE: PLANO
OS_VA2: 20/20-2
OS_ADD: +2.50
OS_VA1: 20/20-2
OD_VA1: 20/20
OD_ADD: +2.50
OD_VA2: 20/20
OS_AXIS: 065

## 2023-01-03 ASSESSMENT — CONFRONTATIONAL VISUAL FIELD TEST (CVF)
OS_FINDINGS: FULL
OD_FINDINGS: FULL

## 2023-01-03 ASSESSMENT — REFRACTION_AUTOREFRACTION
OS_SPHERE: +0.50
OS_CYLINDER: -0.50
OS_AXIS: 076
OD_CYLINDER: -0.25
OD_SPHERE: +0.50
OD_AXIS: 135

## 2023-01-03 ASSESSMENT — VISUAL ACUITY
OS_BCVA: 20/25+2
OD_BCVA: 20/25-2

## 2023-01-03 ASSESSMENT — REFRACTION_CURRENTRX
OD_OVR_VA: 20/
OS_OVR_VA: 20/

## 2023-01-03 ASSESSMENT — SPHEQUIV_DERIVED
OS_SPHEQUIV: 0.25
OS_SPHEQUIV: 0.25
OD_SPHEQUIV: 0.375

## 2023-01-03 ASSESSMENT — DRY EYES - PHYSICIAN NOTES: OS_GENERALCOMMENTS: DRY EYE IMPROVED

## 2023-01-17 ENCOUNTER — DOCTOR'S OFFICE (OUTPATIENT)
Dept: URBAN - NONMETROPOLITAN AREA CLINIC 1 | Facility: CLINIC | Age: 73
Setting detail: OPHTHALMOLOGY
End: 2023-01-17
Payer: COMMERCIAL

## 2023-01-17 DIAGNOSIS — H40.013: ICD-10-CM

## 2023-01-17 PROCEDURE — 99213 OFFICE O/P EST LOW 20 MIN: CPT | Performed by: OPHTHALMOLOGY

## 2023-01-17 ASSESSMENT — REFRACTION_AUTOREFRACTION
OS_SPHERE: +0.50
OD_CYLINDER: -0.25
OD_AXIS: 135
OS_CYLINDER: -0.50
OD_SPHERE: +0.50
OS_AXIS: 076

## 2023-01-17 ASSESSMENT — REFRACTION_MANIFEST
OD_VA2: 20/20
OS_VA2: 20/20-2
OD_ADD: +2.50
OS_AXIS: 065
OD_VA1: 20/20
OD_SPHERE: PLANO
OS_VA1: 20/20-2
OS_SPHERE: +0.50
OD_CYLINDER: SPH
OS_CYLINDER: -0.50
OS_ADD: +2.50

## 2023-01-17 ASSESSMENT — SPHEQUIV_DERIVED
OS_SPHEQUIV: 0.25
OS_SPHEQUIV: 0.25
OD_SPHEQUIV: 0.375

## 2023-01-17 ASSESSMENT — CONFRONTATIONAL VISUAL FIELD TEST (CVF)
OD_FINDINGS: FULL
OS_FINDINGS: FULL

## 2023-01-17 ASSESSMENT — DRY EYES - PHYSICIAN NOTES: OS_GENERALCOMMENTS: DRY EYE IMPROVED

## 2023-01-17 ASSESSMENT — VISUAL ACUITY
OS_BCVA: 20/20-2
OD_BCVA: 20/30+2

## 2023-01-17 ASSESSMENT — TONOMETRY: OD_IOP_MMHG: 19

## 2023-01-17 ASSESSMENT — REFRACTION_CURRENTRX
OS_OVR_VA: 20/
OD_OVR_VA: 20/

## 2023-01-24 ENCOUNTER — AMBUL SURGICAL CARE (OUTPATIENT)
Dept: URBAN - NONMETROPOLITAN AREA SURGERY 1 | Facility: SURGERY | Age: 73
Setting detail: OPHTHALMOLOGY
End: 2023-01-24
Payer: COMMERCIAL

## 2023-01-24 DIAGNOSIS — H40.012: ICD-10-CM

## 2023-01-24 PROCEDURE — G8907 PT DOC NO EVENTS ON DISCHARG: HCPCS | Performed by: CLINIC/CENTER

## 2023-01-24 PROCEDURE — G8918 PT W/O PREOP ORDER IV AB PRO: HCPCS | Performed by: OPHTHALMOLOGY

## 2023-01-24 PROCEDURE — 65855 TRABECULOPLASTY LASER SURG: CPT | Performed by: CLINIC/CENTER

## 2023-01-24 PROCEDURE — G8907 PT DOC NO EVENTS ON DISCHARG: HCPCS | Performed by: OPHTHALMOLOGY

## 2023-01-24 PROCEDURE — 65855 TRABECULOPLASTY LASER SURG: CPT | Performed by: OPHTHALMOLOGY

## 2023-01-24 PROCEDURE — G8918 PT W/O PREOP ORDER IV AB PRO: HCPCS | Performed by: CLINIC/CENTER

## 2023-03-07 ENCOUNTER — DOCTOR'S OFFICE (OUTPATIENT)
Dept: URBAN - NONMETROPOLITAN AREA CLINIC 1 | Facility: CLINIC | Age: 73
Setting detail: OPHTHALMOLOGY
End: 2023-03-07
Payer: COMMERCIAL

## 2023-03-07 DIAGNOSIS — H40.013: ICD-10-CM

## 2023-03-07 PROCEDURE — 99213 OFFICE O/P EST LOW 20 MIN: CPT | Performed by: OPHTHALMOLOGY

## 2023-03-07 ASSESSMENT — REFRACTION_MANIFEST
OD_ADD: +2.50
OS_SPHERE: +0.50
OD_VA2: 20/20
OS_AXIS: 065
OD_CYLINDER: SPH
OS_VA1: 20/20-2
OD_VA1: 20/20
OS_CYLINDER: -0.50
OS_VA2: 20/20-2
OS_ADD: +2.50
OD_SPHERE: PLANO

## 2023-03-07 ASSESSMENT — REFRACTION_AUTOREFRACTION
OD_CYLINDER: -0.25
OD_SPHERE: +0.50
OS_SPHERE: +0.50
OS_CYLINDER: -0.50
OD_AXIS: 135
OS_AXIS: 076

## 2023-03-07 ASSESSMENT — TONOMETRY
OS_IOP_MMHG: 19
OD_IOP_MMHG: 19

## 2023-03-07 ASSESSMENT — REFRACTION_CURRENTRX
OS_OVR_VA: 20/
OD_OVR_VA: 20/

## 2023-03-07 ASSESSMENT — VISUAL ACUITY
OS_BCVA: 20/20-1
OD_BCVA: 20/20-2

## 2023-03-07 ASSESSMENT — CONFRONTATIONAL VISUAL FIELD TEST (CVF)
OD_FINDINGS: FULL
OS_FINDINGS: FULL

## 2023-03-07 ASSESSMENT — SPHEQUIV_DERIVED
OS_SPHEQUIV: 0.25
OD_SPHEQUIV: 0.375
OS_SPHEQUIV: 0.25

## 2023-03-07 ASSESSMENT — DRY EYES - PHYSICIAN NOTES: OS_GENERALCOMMENTS: DRY EYE IMPROVED

## 2023-09-08 ENCOUNTER — DOCTOR'S OFFICE (OUTPATIENT)
Dept: URBAN - NONMETROPOLITAN AREA CLINIC 1 | Facility: CLINIC | Age: 73
Setting detail: OPHTHALMOLOGY
End: 2023-09-08
Payer: COMMERCIAL

## 2023-09-08 DIAGNOSIS — H04.121: ICD-10-CM

## 2023-09-08 DIAGNOSIS — H35.373: ICD-10-CM

## 2023-09-08 DIAGNOSIS — H53.19: ICD-10-CM

## 2023-09-08 DIAGNOSIS — H43.822: ICD-10-CM

## 2023-09-08 DIAGNOSIS — H40.013: ICD-10-CM

## 2023-09-08 DIAGNOSIS — H43.811: ICD-10-CM

## 2023-09-08 DIAGNOSIS — H04.122: ICD-10-CM

## 2023-09-08 PROCEDURE — 99213 OFFICE O/P EST LOW 20 MIN: CPT | Performed by: OPHTHALMOLOGY

## 2023-09-08 PROCEDURE — 92083 EXTENDED VISUAL FIELD XM: CPT | Performed by: OPHTHALMOLOGY

## 2023-09-08 PROCEDURE — 76514 ECHO EXAM OF EYE THICKNESS: CPT | Performed by: OPHTHALMOLOGY

## 2023-09-08 PROCEDURE — 92134 CPTRZ OPH DX IMG PST SGM RTA: CPT | Performed by: OPHTHALMOLOGY

## 2023-09-08 ASSESSMENT — REFRACTION_CURRENTRX
OD_OVR_VA: 20/
OS_OVR_VA: 20/

## 2023-09-08 ASSESSMENT — REFRACTION_MANIFEST
OD_ADD: +2.50
OD_VA2: 20/20
OS_VA2: 20/20-2
OS_ADD: +2.50
OS_SPHERE: +0.50
OS_AXIS: 065
OD_VA1: 20/20
OD_CYLINDER: SPH
OD_SPHERE: PLANO
OS_VA1: 20/20-2
OS_CYLINDER: -0.50

## 2023-09-08 ASSESSMENT — REFRACTION_AUTOREFRACTION
OS_SPHERE: +0.50
OS_CYLINDER: -0.50
OD_AXIS: 135
OD_SPHERE: +0.50
OD_CYLINDER: -0.25
OS_AXIS: 076

## 2023-09-08 ASSESSMENT — DRY EYES - PHYSICIAN NOTES: OS_GENERALCOMMENTS: DRY EYE IMPROVED

## 2023-09-08 ASSESSMENT — CONFRONTATIONAL VISUAL FIELD TEST (CVF)
OS_FINDINGS: FULL
OD_FINDINGS: FULL

## 2023-09-08 ASSESSMENT — SPHEQUIV_DERIVED
OS_SPHEQUIV: 0.25
OS_SPHEQUIV: 0.25
OD_SPHEQUIV: 0.375

## 2023-09-08 ASSESSMENT — VISUAL ACUITY
OD_BCVA: 20/20-2
OS_BCVA: 20/20

## 2023-09-08 ASSESSMENT — PACHYMETRY
OS_CT_UM: 660
OS_CT_CORRECTION: >-7
OD_CT_UM: 670
OD_CT_CORRECTION: >-7

## 2023-09-08 ASSESSMENT — TONOMETRY
OD_IOP_MMHG: 19
OS_IOP_MMHG: 18

## 2023-09-27 ENCOUNTER — EVALUATION (OUTPATIENT)
Dept: PHYSICAL THERAPY | Facility: CLINIC | Age: 73
End: 2023-09-27
Payer: MEDICARE

## 2023-09-27 DIAGNOSIS — M17.11 UNILATERAL PRIMARY OSTEOARTHRITIS, RIGHT KNEE: Primary | ICD-10-CM

## 2023-09-27 PROCEDURE — 97110 THERAPEUTIC EXERCISES: CPT | Performed by: PHYSICAL THERAPIST

## 2023-09-27 PROCEDURE — 97535 SELF CARE MNGMENT TRAINING: CPT | Performed by: PHYSICAL THERAPIST

## 2023-09-27 PROCEDURE — 97161 PT EVAL LOW COMPLEX 20 MIN: CPT | Performed by: PHYSICAL THERAPIST

## 2023-09-27 NOTE — PROGRESS NOTES
PT Evaluation     Today's date: 2023  Patient name: Cris John  : [birth year]  MRN: [id number]  Referring provider: Faith Zabala MD  Dx:   Encounter Diagnosis     ICD-10-CM    1. Unilateral primary osteoarthritis, right knee  M17.11                      Assessment  Assessment details: Patient is a 74year old female who presents to PT for pre-op PT prior to right TKR. Patient demonstrates weakness, decreased rom, abnormal balance/gait and increased pain on examination contributing to limited mobility. Patient would benefit from PT intervention for both pre-op and post-op rehabilitation in order to maximize recovery and functional independence post surgery. Impairments: abnormal gait, abnormal or restricted ROM, activity intolerance, impaired balance, impaired physical strength, lacks appropriate home exercise program and pain with function    Goals  ST. Initiate HEP  2. Patient to report decreased pain by 50% in 4 weeks post-op    LT. Patient to report decreased pain at worst to 1-2/10 in 8 weeks post-op  2. Patient to increase right knee strength to 5/5 in 8 weeks post-op  3.  Patient to increase right knee rom to [facility] in 8 weeks post-op    Plan  Patient would benefit from: PT eval and skilled physical therapy  Planned modality interventions: cryotherapy and thermotherapy: hydrocollator packs  Other planned modality interventions: Modalities PRN  Planned therapy interventions: joint mobilization, ADL retraining, manual therapy, balance, balance/weight bearing training, neuromuscular re-education, patient education, strengthening, stretching, therapeutic activities, therapeutic exercise, therapeutic training, graded activity, functional ROM exercises, flexibility, graded exercise and home exercise program  Frequency: 2x week  Duration in visits: 8  Duration in weeks: 4  Treatment plan discussed with: patient        Subjective Evaluation    History of Present Illness  Mechanism of injury: Patient presents to PT for pre-op PT prior to right TKR. Patient is scheduled to have surgery on . Patient reports she is opting for surgery due to increased pain t/o right knee causing difficulty walking. Patient reports she has a Bakers Cyst in her knee along with a torn meniscus and arthritis causing the pain in her knee. Patient had left knee replaced 3 years ago and is doing well. She will spend 1 night in the hospital after surgery. Patient is now referred to oppt. Patient Goals  Patient goals for therapy: decreased pain  Patient goal: To be able to walk around and do steps without pain  Pain  At best pain ratin  At worst pain ratin  Aggravating factors: stair climbing and walking    Social Support  Steps to enter house: yes  2  Stairs in house: yes   13  Lives in: multiple-level home  Lives with: alone          Objective     Neurological Testing     Sensation     Knee   Left Knee   Intact: light touch    Right Knee   Intact: light touch     Active Range of Motion   Left Knee   Normal active range of motion    Right Knee   Flexion: 105 degrees with pain  Extension: 0 degrees     Strength/Myotome Testing     Left Knee   Normal strength    Right Knee   Flexion: 4+  Extension: 5    Ambulation     Observational Gait   Gait: antalgic   Decreased walking speed, stride length, right stance time and right step length. Precautions Right Knee TKR       Manuals        Right knee PROM and stretch                                Neuro Re-Ed                                                                 Ther Ex        Nustep L3 8 min       TR/HR 2x10       Standing SLR 3-way Abd 2x10 B/L       LAQ        Seated HS curls with TB        QS        SAQ        SLR        Heel Slides                                Ther Activity                        Gait Training                        Modalities                           Access Code: [email or web address]  URL: [email or web address]  Date: 09/27/2023  Prepared by: Gopi Owens    Exercises  - Supine Quad Set  - 1 x daily - 7 x weekly - 3 sets - 10 reps - 5 second hold  - Supine Heel Slide  - 1 x daily - 7 x weekly - 3 sets - 10 reps  - Small Range Straight Leg Raise  - 1 x daily - 7 x weekly - 3 sets - 10 reps  - Supine Knee Extension Strengthening  - 1 x daily - 7 x weekly - 3 sets - 10 reps  - Seated Long Arc Quad  - 1 x daily - 7 x weekly - 3 sets - 10 reps

## 2023-09-27 NOTE — LETTER
[id number]    Kaleb Alvarez MD  [address] [place] [address]     Patient: Samantha Archibald   YOB: 1950   Date of Visit: 2023     Encounter Diagnosis     ICD-10-CM    1. Unilateral primary osteoarthritis, right knee  M17.11           Dear Dr. Santa Gibbons: Thank you for your recent referral of Samantha Archibald. Please review the attached evaluation summary from [facility] recent visit. Please verify that you agree with the plan of care by signing the attached order. If you have any questions or concerns, please do not hesitate to call. I sincerely appreciate the opportunity to share in the care of one of your patients and hope to have another opportunity to work with you in the near future. Sincerely,    Gabriel García, PT      Referring Provider:      I certify that I have read the below Plan of Care and certify the need for these services furnished under this plan of treatment while under my care. Kaleb Alvarez MD  [address] [place] [address] Dr  Via Fax: [phone]          PT Evaluation     Today's date: 2023  Patient name: Samantha Archibald  :   MRN: [id number]  Referring provider: Kaleb Alvarez MD  Dx:   Encounter Diagnosis     ICD-10-CM    1. Unilateral primary osteoarthritis, right knee  M17.11                      Assessment  Assessment details: Patient is a 74year old female who presents to PT for pre-op PT prior to right TKR. Patient demonstrates weakness, decreased rom, abnormal balance/gait and increased pain on examination contributing to limited mobility. Patient would benefit from PT intervention for both pre-op and post-op rehabilitation in order to maximize recovery and functional independence post surgery.    Impairments: abnormal gait, abnormal or restricted ROM, activity intolerance, impaired balance, impaired physical strength, lacks appropriate home exercise program and pain with function    Goals  ST. Initiate HEP  2. Patient to report decreased pain by 50% in 4 weeks post-op    LT. Patient to report decreased pain at worst to 1-2/10 in 8 weeks post-op  2. Patient to increase right knee strength to 5/5 in 8 weeks post-op  3. Patient to increase right knee rom to [facility] in 8 weeks post-op    Plan  Patient would benefit from: PT eval and skilled physical therapy  Planned modality interventions: cryotherapy and thermotherapy: hydrocollator packs  Other planned modality interventions: Modalities PRN  Planned therapy interventions: joint mobilization, ADL retraining, manual therapy, balance, balance/weight bearing training, neuromuscular re-education, patient education, strengthening, stretching, therapeutic activities, therapeutic exercise, therapeutic training, graded activity, functional ROM exercises, flexibility, graded exercise and home exercise program  Frequency: 2x week  Duration in visits: 8  Duration in weeks: 4  Treatment plan discussed with: patient        Subjective Evaluation    History of Present Illness  Mechanism of injury: Patient presents to PT for pre-op PT prior to right TKR. Patient is scheduled to have surgery on . Patient reports she is opting for surgery due to increased pain t/o right knee causing difficulty walking. Patient reports she has a Bakers Cyst in her knee along with a torn meniscus and arthritis causing the pain in her knee. Patient had left knee replaced 3 years ago and is doing well. She will spend 1 night in the hospital after surgery. Patient is now referred to oppt.     Patient Goals  Patient goals for therapy: decreased pain  Patient goal: To be able to walk around and do steps without pain  Pain  At best pain ratin  At worst pain ratin  Aggravating factors: stair climbing and walking    Social Support  Steps to enter house: yes  2  Stairs in house: yes   13  Lives in: multiple-level home  Lives with: alone          Objective Neurological Testing     Sensation     Knee   Left Knee   Intact: light touch    Right Knee   Intact: light touch     Active Range of Motion   Left Knee   Normal active range of motion    Right Knee   Flexion: 105 degrees with pain  Extension: 0 degrees     Strength/Myotome Testing     Left Knee   Normal strength    Right Knee   Flexion: 4+  Extension: 5    Ambulation     Observational Gait   Gait: antalgic   Decreased walking speed, stride length, right stance time and right step length. Precautions Right Knee TKR       Manuals 9/27       Right knee PROM and stretch                                Neuro Re-Ed                                                                 Ther Ex        Nustep L3 8 min       TR/HR 2x10       Standing SLR 3-way Abd 2x10 B/L       LAQ        Seated HS curls with TB        QS        SAQ        SLR        Heel Slides                                Ther Activity                        Gait Training                        Modalities                          Access Code: [email or web address]  URL: [email or web address]  Date: 09/27/2023  Prepared by: Claudia Lowery    Exercises  - Supine Quad Set  - 1 x daily - 7 x weekly - 3 sets - 10 reps - 5 second hold  - Supine Heel Slide  - 1 x daily - 7 x weekly - 3 sets - 10 reps  - Small Range Straight Leg Raise  - 1 x daily - 7 x weekly - 3 sets - 10 reps  - Supine Knee Extension Strengthening  - 1 x daily - 7 x weekly - 3 sets - 10 reps  - Seated Long Arc Quad  - 1 x daily - 7 x weekly - 3 sets - 10 reps

## 2023-10-04 ENCOUNTER — OFFICE VISIT (OUTPATIENT)
Dept: PHYSICAL THERAPY | Facility: CLINIC | Age: 73
End: 2023-10-04
Payer: MEDICARE

## 2023-10-04 DIAGNOSIS — M17.11 UNILATERAL PRIMARY OSTEOARTHRITIS, RIGHT KNEE: Primary | ICD-10-CM

## 2023-10-04 PROCEDURE — 97110 THERAPEUTIC EXERCISES: CPT | Performed by: PHYSICAL THERAPIST

## 2023-10-04 NOTE — PROGRESS NOTES
Daily Note     Today's date: 10/4/2023  Patient name: Marion Liao  :   MRN: [id number]  Referring provider: Thelma Posey MD  Dx:   Encounter Diagnosis     ICD-10-CM    1. Unilateral primary osteoarthritis, right knee  M17.11                      Subjective: Patient states she will get pain with weight bearing on her right LE. Objective: See treatment diary below      Assessment: Patient with good tolerance to first treatment today. Minimal VC's required for correct performance of TE. Patient independent and compliant with HEP. Plan: Continue per plan of care. Precautions Right Knee TKR       Manuals 9/27 10/4      Right knee PROM and stretch                                Neuro Re-Ed                                                                 Ther Ex        Nustep L3 8 min L3 10 min      TR/HR 2x10 2x10      Standing SLR 3-way Abd 2x10 B/L Abd, Ext 2x10 B/L      LAQ  2x10      Seated HS curls with TB        QS        SAQ        SLR        Heel Slides                                Ther Activity                        Gait Training                        Modalities                          Access Code: [email or web address]  URL: [email or web address]  Date: 2023  Prepared by: Marv Broussard    Exercises  - Supine Quad Set  - 1 x daily - 7 x weekly - 3 sets - 10 reps - 5 second hold  - Supine Heel Slide  - 1 x daily - 7 x weekly - 3 sets - 10 reps  - Small Range Straight Leg Raise  - 1 x daily - 7 x weekly - 3 sets - 10 reps  - Supine Knee Extension Strengthening  - 1 x daily - 7 x weekly - 3 sets - 10 reps  - Seated Long Arc Quad  - 1 x daily - 7 x weekly - 3 sets - 10 reps

## 2023-10-05 ENCOUNTER — OFFICE VISIT (OUTPATIENT)
Dept: URGENT CARE | Facility: CLINIC | Age: 73
End: 2023-10-05
Payer: MEDICARE

## 2023-10-05 VITALS
TEMPERATURE: 98.7 F | HEART RATE: 104 BPM | OXYGEN SATURATION: 97 % | DIASTOLIC BLOOD PRESSURE: 74 MMHG | RESPIRATION RATE: 18 BRPM | WEIGHT: 205 LBS | SYSTOLIC BLOOD PRESSURE: 141 MMHG

## 2023-10-05 DIAGNOSIS — R30.0 DYSURIA: Primary | ICD-10-CM

## 2023-10-05 PROBLEM — K21.9 GERD (GASTROESOPHAGEAL REFLUX DISEASE): Status: ACTIVE | Noted: 2023-10-05

## 2023-10-05 PROBLEM — F41.9 ANXIETY: Status: ACTIVE | Noted: 2023-10-05

## 2023-10-05 LAB
SL AMB  POCT GLUCOSE, UA: NORMAL
SL AMB LEUKOCYTE ESTERASE,UA: NORMAL
SL AMB POCT BILIRUBIN,UA: NORMAL
SL AMB POCT BLOOD,UA: NORMAL
SL AMB POCT CLARITY,UA: CLEAR
SL AMB POCT COLOR,UA: YELLOW
SL AMB POCT KETONES,UA: NORMAL
SL AMB POCT NITRITE,UA: NORMAL
SL AMB POCT PH,UA: 6
SL AMB POCT SPECIFIC GRAVITY,UA: 1005
SL AMB POCT URINE PROTEIN: NORMAL
SL AMB POCT UROBILINOGEN: 0.2

## 2023-10-05 PROCEDURE — G0463 HOSPITAL OUTPT CLINIC VISIT: HCPCS | Performed by: PHYSICIAN ASSISTANT

## 2023-10-05 PROCEDURE — 87147 CULTURE TYPE IMMUNOLOGIC: CPT | Performed by: PHYSICIAN ASSISTANT

## 2023-10-05 PROCEDURE — 99203 OFFICE O/P NEW LOW 30 MIN: CPT | Performed by: PHYSICIAN ASSISTANT

## 2023-10-05 PROCEDURE — 87086 URINE CULTURE/COLONY COUNT: CPT | Performed by: PHYSICIAN ASSISTANT

## 2023-10-05 PROCEDURE — 81002 URINALYSIS NONAUTO W/O SCOPE: CPT | Performed by: PHYSICIAN ASSISTANT

## 2023-10-05 RX ORDER — CLONAZEPAM 0.5 MG/1
TABLET ORAL
COMMUNITY
Start: 2023-07-17

## 2023-10-05 RX ORDER — PANTOPRAZOLE SODIUM 40 MG/1
40 TABLET, DELAYED RELEASE ORAL DAILY
COMMUNITY
Start: 2016-08-16

## 2023-10-05 RX ORDER — FEXOFENADINE HCL 180 MG/1
180 TABLET ORAL
COMMUNITY

## 2023-10-05 RX ORDER — SIMETHICONE 125 MG
CAPSULE ORAL
COMMUNITY

## 2023-10-05 RX ORDER — CHLORHEXIDINE GLUCONATE 4 G/100ML
SOLUTION TOPICAL
COMMUNITY
Start: 2023-09-07

## 2023-10-05 RX ORDER — SIMVASTATIN 20 MG
20 TABLET ORAL DAILY
COMMUNITY
Start: 2023-06-16

## 2023-10-05 RX ORDER — AMLODIPINE BESYLATE 10 MG/1
1 TABLET ORAL EVERY MORNING
COMMUNITY

## 2023-10-05 RX ORDER — DENOSUMAB 60 MG/ML
INJECTION SUBCUTANEOUS
COMMUNITY

## 2023-10-05 RX ORDER — ESCITALOPRAM OXALATE 20 MG/1
20 TABLET ORAL DAILY
COMMUNITY
Start: 2023-07-16

## 2023-10-05 NOTE — PATIENT INSTRUCTIONS
Dysuria   WHAT YOU NEED TO KNOW:   Dysuria is difficulty urinating, or pain, burning, or discomfort with urination. Dysuria is usually a symptom of another problem. DISCHARGE INSTRUCTIONS:   Return to the emergency department if:   You have severe back, side, or abdominal pain. You have fever and shaking chills. You vomit several times in a row. Contact your healthcare provider if:   Your symptoms do not go away, even after treatment. You have questions or concerns about your condition or care. Medicines:   Medicines  may be given to help treat a bacterial infection or help decrease bladder spasms. Take your medicine as directed. Contact your healthcare provider if you think your medicine is not helping or if you have side effects. Tell your provider if you are allergic to any medicine. Keep a list of the medicines, vitamins, and herbs you take. Include the amounts, and when and why you take them. Bring the list or the pill bottles to follow-up visits. Carry your medicine list with you in case of an emergency. Follow up with your healthcare provider as directed: Your healthcare provider may also refer you to a urologist or nephrologist to have additional testing. Write down your questions so you remember to ask them during your visits. Manage your dysuria:   Drink more liquids. Liquids help flush out bacteria that may be causing an infection. Ask your healthcare provider how much liquid to drink each day and which liquids are best for you. Take sitz baths as directed. Fill a bathtub with 4 to 6 inches of warm water. You may also use a sitz bath pan that fits over a toilet. Sit in the sitz bath for 20 minutes. Do this 2 to 3 times a day, or as directed. The warm water can help decrease pain and swelling. © Copyright [facility] 2023 Information is for End User's use only and may not be sold, redistributed or otherwise used for commercial purposes.   The above information is an  only. It is not intended as medical advice for individual conditions or treatments. Talk to your doctor, nurse or pharmacist before following any medical regimen to see if it is safe and effective for you.

## 2023-10-05 NOTE — PROGRESS NOTES
[facility]        NAME: Erich Hayes is a 74 y.o. female  : 1950    MRN: [id number]  DATE: 2023  TIME: 11:55 AM    Assessment and Plan   Dysuria [R30.0]  1. Dysuria  POCT urine dip    Urine culture            Patient Instructions   Patient Instructions   Dysuria   WHAT YOU NEED TO KNOW:   Dysuria is difficulty urinating, or pain, burning, or discomfort with urination. Dysuria is usually a symptom of another problem. DISCHARGE INSTRUCTIONS:   Return to the emergency department if:   • You have severe back, side, or abdominal pain. • You have fever and shaking chills. • You vomit several times in a row. Contact your healthcare provider if:   • Your symptoms do not go away, even after treatment. • You have questions or concerns about your condition or care. Medicines:   • Medicines  may be given to help treat a bacterial infection or help decrease bladder spasms. • Take your medicine as directed. Contact your healthcare provider if you think your medicine is not helping or if you have side effects. Tell your provider if you are allergic to any medicine. Keep a list of the medicines, vitamins, and herbs you take. Include the amounts, and when and why you take them. Bring the list or the pill bottles to follow-up visits. Carry your medicine list with you in case of an emergency. Follow up with your healthcare provider as directed: Your healthcare provider may also refer you to a urologist or nephrologist to have additional testing. Write down your questions so you remember to ask them during your visits. Manage your dysuria:   • Drink more liquids. Liquids help flush out bacteria that may be causing an infection. Ask your healthcare provider how much liquid to drink each day and which liquids are best for you. • Take sitz baths as directed. Fill a bathtub with 4 to 6 inches of warm water. You may also use a sitz bath pan that fits over a toilet.  Sit in the sitz bath for 20 minutes. Do this 2 to 3 times a day, or as directed. The warm water can help decrease pain and swelling. © Copyright [employer]on[employer] 2023 Information is for End User's use only and may not be sold, redistributed or otherwise used for commercial purposes. The above information is an  only. It is not intended as medical advice for individual conditions or treatments. Talk to your doctor, nurse or pharmacist before following any medical regimen to see if it is safe and effective for you. Follow up with PCP in 3-5 days. Proceed to  ER if symptoms worsen. Chief Complaint     Chief Complaint   Patient presents with   • Possible UTI         History of Present Illness       -The patient states she has dysuria x 2 days  -She states she does not get frequent infections. She has had similar symptoms in the past and did not have and infection but is getting a knee [place] and is concerened. Review of Systems   Review of Systems   HENT: Negative for congestion, postnasal drip, rhinorrhea, sinus pressure and sinus pain. Genitourinary: Positive for dysuria. Negative for enuresis, genital sores, hematuria, menstrual problem, pelvic pain, urgency, vaginal bleeding, vaginal discharge and vaginal pain. Neurological: Negative for dizziness and headaches.          Current Medications       Current Outpatient Medications:   •  chlorhexidine (HIBICLENS) 4 % external liquid, Wash surgical site daily starting five days before surgery, Disp: , Rfl:   •  clonazePAM (KlonoPIN) 0.5 mg tablet, take 1 tablet by mouth once daily if needed for SEVERE anxiety ONLY, Disp: , Rfl:   •  escitalopram (LEXAPRO) 20 mg tablet, Take 20 mg by mouth daily, Disp: , Rfl:   •  pantoprazole (PROTONIX) 40 mg tablet, Take 40 mg by mouth daily, Disp: , Rfl:   •  simvastatin (ZOCOR) 20 mg tablet, Take 20 mg by mouth daily, Disp: , Rfl:   •  amLODIPine (NORVASC) 10 mg tablet, Take 1 tablet by mouth every morning, Disp: , Rfl:   •  denosumab (Prolia) 60 mg/mL, , Disp: , Rfl:   •  fexofenadine (ALLEGRA) 180 MG tablet, Take 180 mg by mouth, Disp: , Rfl:   •  simethicone (MYLICON,GAS-X) 572 MG CAPS, Take by mouth, Disp: , Rfl:     Current Allergies     Allergies as of 10/05/2023 - Reviewed 10/05/2023   Allergen Reaction Noted   • Penicillins Rash 06/24/2020            The following portions of the patient's history were reviewed and updated as appropriate: allergies, current medications, past family history, past medical history, past social history, past surgical history and problem list.     Past Medical History:   Diagnosis Date   • Cancer ([address])    • Hyperlipidemia    • Hypertension        Past Surgical History:   Procedure Laterality Date   • CATARACT EXTRACTION Bilateral    • HYSTERECTOMY     • REPLACEMENT TOTAL KNEE Left 2020       History reviewed. No pertinent family history. Medications have been verified. Objective   /74   Pulse 104   Temp 98.7 °F (37.1 °C)   Resp 18   Wt 93 kg (205 lb)   SpO2 97%        Physical Exam     Physical Exam  Abdominal:      Tenderness: There is abdominal tenderness in the suprapubic area. There is no right CVA tenderness or left CVA tenderness. Negative signs include McBurney's sign, psoas sign and obturator sign.             -Urine dip is within normal limits. I will send out a urine culture. I suggest supportive treatment for now.

## 2023-10-06 LAB — BACTERIA UR CULT: ABNORMAL

## 2023-10-09 ENCOUNTER — OFFICE VISIT (OUTPATIENT)
Dept: PHYSICAL THERAPY | Facility: CLINIC | Age: 73
End: 2023-10-09
Payer: MEDICARE

## 2023-10-09 DIAGNOSIS — M17.11 UNILATERAL PRIMARY OSTEOARTHRITIS, RIGHT KNEE: Primary | ICD-10-CM

## 2023-10-09 PROCEDURE — 97110 THERAPEUTIC EXERCISES: CPT | Performed by: PHYSICAL THERAPIST

## 2023-10-09 PROCEDURE — 97164 PT RE-EVAL EST PLAN CARE: CPT | Performed by: PHYSICAL THERAPIST

## 2023-10-09 NOTE — PROGRESS NOTES
PT Re-Evaluation     Today's date: 10/9/2023  Patient name: Rakan Begum  : [birth year]  MRN: [id number]  Referring provider: Esme Gaspar MD  Dx:   Encounter Diagnosis     ICD-10-CM    1. Unilateral primary osteoarthritis, right knee  M17.11                      Assessment  Assessment details: Patient returns to PT for first post-op visit following right TKR performed on 10/6. PT examination shows limitations including weakness, decreased stability, limited rom, abnormal gait and increased pain limiting functional mobility. Patient would benefit from PT intervention including exercises for rom, strength and stability, functional training, manual therapy, HEP, balance/gait training, aerobic conditioning and pain relieving modalities in order to maximize functional independence and mobility. Impairments: abnormal gait, abnormal or restricted ROM, activity intolerance, impaired balance, impaired physical strength, lacks appropriate home exercise program and pain with function    Goals  ST. Initiate HEP  2. Patient to report decreased pain by 50% in 4 weeks post-op    LT. Patient to report decreased pain at worst to 1-2/10 in 8 weeks post-op  2. Patient to increase right knee strength to 5/5 in 8 weeks post-op  3.  Patient to increase right knee rom to [facility] in 8 weeks post-op    Plan  Patient would benefit from: skilled physical therapy  Planned modality interventions: cryotherapy and thermotherapy: hydrocollator packs  Other planned modality interventions: Modalities PRN  Planned therapy interventions: joint mobilization, ADL retraining, manual therapy, balance, balance/weight bearing training, neuromuscular re-education, patient education, strengthening, stretching, therapeutic activities, therapeutic exercise, therapeutic training, graded activity, functional ROM exercises, flexibility, graded exercise and home exercise program  Frequency: 2x week  Duration in visits: 8  Duration in weeks: 4  Treatment plan discussed with: patient        Subjective Evaluation    History of Present Illness  Date of surgery: 10/6/2023  Mechanism of injury: surgery  Mechanism of injury: Patient returns to PT for first post-op visit today. Patient underwent right TKR on 10/6. Patient spent 1 night in hospital after surgery. Patient reports she is doing well moving around at home and is negotiating steps. Patient reports she feels tired since the surgery. She is ambulating using RW. Patient is using her bike at home from her surgeon. Patient states she has some pain in her knee at this time. Patient RTD on 10/27 and is now referred to oppt. Patient Goals  Patient goals for therapy: decreased pain  Patient goal: To be able to walk around and do steps without pain  Pain  At best pain ratin  At worst pain ratin  Relieving factors: ice and medications  Aggravating factors: stair climbing and walking    Social Support  Steps to enter house: yes  2  Stairs in house: yes   13  Lives in: multiple-level home  Lives with: alone          Objective     Observations     Right Knee   Positive for incision. Additional Observation Details  Well healing incision noted in right knee. No signs or symptoms of infection noted. Neurological Testing     Sensation     Knee   Left Knee   Intact: light touch    Right Knee   Intact: light touch     Active Range of Motion   Left Knee   Normal active range of motion    Right Knee   Flexion: 90 degrees   Extension: -14 degrees     Strength/Myotome Testing     Left Knee   Normal strength    Right Knee   Quadriceps contraction: poor    Additional Strength Details  Strength NT due to recent surgery. Will assess as able. Ambulation   Weight-Bearing Status   Weight-Bearing Status (Right): weight-bearing as tolerated    Assistive device used: front-wheeled walker    Observational Gait   Gait: antalgic   Decreased walking speed, stride length, right stance time and right step length. Precautions Right Knee TKR       Manuals 9/27 10/4 10/9     Right knee PROM and stretch                                Neuro Re-Ed                                                                 Ther Ex        Nustep L3 8 min L3 10 min      TR/HR 2x10 2x10 2x10     Standing SLR 3-way Abd 2x10 B/L Abd, Ext 2x10 B/L Abd 2x10     LAQ  2x10      Seated HS curls with TB        QS   2x10 3" hold     SAQ        SLR        Heel Slides   2x10                             Ther Activity                        Gait Training                        Modalities                             Access Code: [email or web address]  URL: [email or web address]  Date: 09/27/2023  Prepared by: Eunice Smith    Exercises  - Supine Quad Set  - 1 x daily - 7 x weekly - 3 sets - 10 reps - 5 second hold  - Supine Heel Slide  - 1 x daily - 7 x weekly - 3 sets - 10 reps  - Small Range Straight Leg Raise  - 1 x daily - 7 x weekly - 3 sets - 10 reps  - Supine Knee Extension Strengthening  - 1 x daily - 7 x weekly - 3 sets - 10 reps  - Seated Long Arc Quad  - 1 x daily - 7 x weekly - 3 sets - 10 reps

## 2023-10-09 NOTE — LETTER
[id number]    Jimmie Hernandez MD  [address] [place] [address] Dr    Patient: Kathy Aguilera   YOB: 1950   Date of Visit: 10/9/2023     Encounter Diagnosis     ICD-10-CM    1. Unilateral primary osteoarthritis, right knee  M17.11           Dear Dr. Yimi Swan: Thank you for your recent referral of Kathy Aguilera. Please review the attached evaluation summary from [facility] recent visit. Please verify that you agree with the plan of care by signing the attached order. If you have any questions or concerns, please do not hesitate to call. I sincerely appreciate the opportunity to share in the care of one of your patients and hope to have another opportunity to work with you in the near future. Sincerely,    Matthew Grant, PT      Referring Provider:      I certify that I have read the below Plan of Care and certify the need for these services furnished under this plan of treatment while under my care. Jimmie Hernandez MD  [address] [place] [address]  Via Fax: [phone]          PT Re-Evaluation     Today's date: 10/9/2023  Patient name: Kathy Aguilera  :   MRN: [id number]  Referring provider: Jimmie Hernandez MD  Dx:   Encounter Diagnosis     ICD-10-CM    1. Unilateral primary osteoarthritis, right knee  M17.11                      Assessment  Assessment details: Patient returns to PT for first post-op visit following right TKR performed on 10/6. PT examination shows limitations including weakness, decreased stability, limited rom, abnormal gait and increased pain limiting functional mobility. Patient would benefit from PT intervention including exercises for rom, strength and stability, functional training, manual therapy, HEP, balance/gait training, aerobic conditioning and pain relieving modalities in order to maximize functional independence and mobility.    Impairments: abnormal gait, abnormal or restricted ROM, activity intolerance, impaired balance, impaired physical strength, lacks appropriate home exercise program and pain with function    Goals  ST. Initiate HEP  2. Patient to report decreased pain by 50% in 4 weeks post-op    LT. Patient to report decreased pain at worst to 1-2/10 in 8 weeks post-op  2. Patient to increase right knee strength to 5/5 in 8 weeks post-op  3. Patient to increase right knee rom to [facility] in 8 weeks post-op    Plan  Patient would benefit from: skilled physical therapy  Planned modality interventions: cryotherapy and thermotherapy: hydrocollator packs  Other planned modality interventions: Modalities PRN  Planned therapy interventions: joint mobilization, ADL retraining, manual therapy, balance, balance/weight bearing training, neuromuscular re-education, patient education, strengthening, stretching, therapeutic activities, therapeutic exercise, therapeutic training, graded activity, functional ROM exercises, flexibility, graded exercise and home exercise program  Frequency: 2x week  Duration in visits: 8  Duration in weeks: 4  Treatment plan discussed with: patient        Subjective Evaluation    History of Present Illness  Date of surgery: 10/6/2023  Mechanism of injury: surgery  Mechanism of injury: Patient returns to PT for first post-op visit today. Patient underwent right TKR on 10/6. Patient spent 1 night in hospital after surgery. Patient reports she is doing well moving around at home and is negotiating steps. Patient reports she feels tired since the surgery. She is ambulating using RW. Patient is using her bike at home from her surgeon. Patient states she has some pain in her knee at this time. Patient RTD on 10/27 and is now referred to oppt.   Patient Goals  Patient goals for therapy: decreased pain  Patient goal: To be able to walk around and do steps without pain  Pain  At best pain ratin  At worst pain ratin  Relieving factors: ice and medications  Aggravating factors: stair climbing and walking    Social Support  Steps to enter house: yes  2  Stairs in house: yes   13  Lives in: multiple-level home  Lives with: alone          Objective     Observations     Right Knee   Positive for incision. Additional Observation Details  Well healing incision noted in right knee. No signs or symptoms of infection noted. Neurological Testing     Sensation     Knee   Left Knee   Intact: light touch    Right Knee   Intact: light touch     Active Range of Motion   Left Knee   Normal active range of motion    Right Knee   Flexion: 90 degrees   Extension: -14 degrees     Strength/Myotome Testing     Left Knee   Normal strength    Right Knee   Quadriceps contraction: poor    Additional Strength Details  Strength NT due to recent surgery. Will assess as able. Ambulation   Weight-Bearing Status   Weight-Bearing Status (Right): weight-bearing as tolerated    Assistive device used: front-wheeled walker    Observational Gait   Gait: antalgic   Decreased walking speed, stride length, right stance time and right step length. Precautions Right Knee TKR       Manuals 9/27 10/4 10/9     Right knee PROM and stretch                                Neuro Re-Ed                                                                 Ther Ex        Nustep L3 8 min L3 10 min      TR/HR 2x10 2x10 2x10     Standing SLR 3-way Abd 2x10 B/L Abd, Ext 2x10 B/L Abd 2x10     LAQ  2x10      Seated HS curls with TB        QS   2x10 3" hold     SAQ        SLR        Heel Slides   2x10                             Ther Activity                        Gait Training                        Modalities                            Access Code: [email or web address]  URL: [email or web address]  Date: 09/27/2023  Prepared by: Micah Laws    Exercises  - Supine Quad Set  - 1 x daily - 7 x weekly - 3 sets - 10 reps - 5 second hold  - Supine Heel Slide  - 1 x daily - 7 x weekly - 3 sets - 10 reps  - Small Range Straight Leg Raise  - 1 x daily - 7 x weekly - 3 sets - 10 reps  - Supine Knee Extension Strengthening  - 1 x daily - 7 x weekly - 3 sets - 10 reps  - Seated Long Arc Quad  - 1 x daily - 7 x weekly - 3 sets - 10 reps

## 2023-10-12 ENCOUNTER — OFFICE VISIT (OUTPATIENT)
Dept: PHYSICAL THERAPY | Facility: CLINIC | Age: 73
End: 2023-10-12
Payer: MEDICARE

## 2023-10-12 DIAGNOSIS — M17.11 UNILATERAL PRIMARY OSTEOARTHRITIS, RIGHT KNEE: Primary | ICD-10-CM

## 2023-10-12 PROCEDURE — 97110 THERAPEUTIC EXERCISES: CPT

## 2023-10-12 NOTE — PROGRESS NOTES
Daily Note     Today's date: 10/12/2023  Patient name: Edgard Waller  :   MRN: [id number]  Referring provider: Sheldon Carrasco MD  Dx:   Encounter Diagnosis     ICD-10-CM    1. Unilateral primary osteoarthritis, right knee  M17.11           Start Time: 1255  Stop Time: 1330  Total time in clinic (min): 35 minutes    Subjective: Patient ambulated into clinic today with SPC. She stated soreness in knee today. Objective: See treatment diary below      Assessment: Tolerated treatment well. Patient demonstrated fatigue post treatment and exhibited good technique with therapeutic exercises, with no increase in symptoms. Patient would benefit from further PT to maximize functional independence. Plan: Continue per plan of care. Precautions Right Knee TKR       Manuals 9/27 10/4 10/9 10/12    Right knee PROM and stretch                                Neuro Re-Ed                                                                 Ther Ex        Nustep L3 8 min L3 10 min      TR/HR 2x10 2x10 2x10 2x10     Standing SLR 3-way Abd 2x10 B/L Abd, Ext 2x10 B/L Abd 2x10 Abd 2x10     LAQ  2x10  2x10     Seated HS curls with TB    Red TB 2x10     QS   2x10 3" hold 2x10 3" hold    SAQ    2x10     SLR        Heel Slides   2x10 2x10     Standing HS curl    20x     Hip abd    Red TB 2x10 singles     Hip add    20x 3" hold     Ther Activity                        Gait Training                        Modalities                            Access Code: [email or web address]  URL: [email or web address]  Date: 2023  Prepared by: Edgar De La Garza    Exercises  - Supine Quad Set  - 1 x daily - 7 x weekly - 3 sets - 10 reps - 5 second hold  - Supine Heel Slide  - 1 x daily - 7 x weekly - 3 sets - 10 reps  - Small Range Straight Leg Raise  - 1 x daily - 7 x weekly - 3 sets - 10 reps  - Supine Knee Extension Strengthening  - 1 x daily - 7 x weekly - 3 sets - 10 reps  - Seated Long Arc Quad  - 1 x daily - 7 x weekly - 3 sets - 10 reps

## 2023-10-16 ENCOUNTER — OFFICE VISIT (OUTPATIENT)
Dept: PHYSICAL THERAPY | Facility: CLINIC | Age: 73
End: 2023-10-16
Payer: MEDICARE

## 2023-10-16 DIAGNOSIS — M17.11 UNILATERAL PRIMARY OSTEOARTHRITIS, RIGHT KNEE: Primary | ICD-10-CM

## 2023-10-16 PROCEDURE — 97110 THERAPEUTIC EXERCISES: CPT | Performed by: PHYSICAL THERAPIST

## 2023-10-16 NOTE — PROGRESS NOTES
Daily Note     Today's date: 10/16/2023  Patient name: Liliya Castelan  :   MRN: [id number]  Referring provider: Yajaira Powell MD  Dx:   Encounter Diagnosis     ICD-10-CM    1. Unilateral primary osteoarthritis, right knee  M17.11                      Subjective: Patient reports increased pain today in right knee. Objective: See treatment diary below      Assessment: Tolerated treatment well. Patient demonstrated fatigue post treatment and exhibited good technique with therapeutic exercises. PT held progressions today due to increased pain. Will attempt nustep at NV. Plan: Continue per plan of care. Precautions Right Knee TKR       Manuals 9/27 10/4 10/9 10/12 10/16   Right knee PROM and stretch                                Neuro Re-Ed                                                                 Ther Ex        Nustep L3 8 min L3 10 min      TR/HR 2x10 2x10 2x10 2x10  2x10   Standing SLR 3-way Abd 2x10 B/L Abd, Ext 2x10 B/L Abd 2x10 Abd 2x10  Abd 2x10 B/L   LAQ  2x10  2x10  2x10   Seated HS curls with TB    Red TB 2x10  RTB 2x10   QS   2x10 3" hold 2x10 3" hold 2x10 3" hold   SAQ    2x10  2x10   SLR        Heel Slides   2x10 2x10  2x10   Standing HS curl    20x  20x   Hip abd    Red TB 2x10 singles  RTB 2x10 singles   Hip add    20x 3" hold  20x3" hold   Ther Activity                        Gait Training                        Modalities                            Access Code: [email or web address]  URL: [email or web address]  Date: 2023  Prepared by: Sherie Comer    Exercises  - Supine Quad Set  - 1 x daily - 7 x weekly - 3 sets - 10 reps - 5 second hold  - Supine Heel Slide  - 1 x daily - 7 x weekly - 3 sets - 10 reps  - Small Range Straight Leg Raise  - 1 x daily - 7 x weekly - 3 sets - 10 reps  - Supine Knee Extension Strengthening  - 1 x daily - 7 x weekly - 3 sets - 10 reps  - Seated Long Arc Quad  - 1 x daily - 7 x weekly - 3 sets - 10 reps

## 2023-10-19 ENCOUNTER — OFFICE VISIT (OUTPATIENT)
Dept: PHYSICAL THERAPY | Facility: CLINIC | Age: 73
End: 2023-10-19
Payer: MEDICARE

## 2023-10-19 DIAGNOSIS — M17.11 UNILATERAL PRIMARY OSTEOARTHRITIS, RIGHT KNEE: Primary | ICD-10-CM

## 2023-10-19 PROCEDURE — 97110 THERAPEUTIC EXERCISES: CPT

## 2023-10-19 NOTE — PROGRESS NOTES
Daily Note     Today's date: 10/19/2023  Patient name: Jorge Da Silva  : [id number]  MRN: [id number]  Referring provider: Josafat Machado MD  Dx:   Encounter Diagnosis     ICD-10-CM    1. Unilateral primary osteoarthritis, right knee  M17.11           Start Time: 1300  Stop Time: 1350  Total time in clinic (min): 50 minutes    Subjective: Patient reports increased pain today in right knee. Objective: See treatment diary below      Assessment: Tolerated treatment well. Patient demonstrated fatigue post treatment and exhibited good technique with therapeutic exercises. Most limited due to pain. She was able to progress with addition of Nustep and SLR w/assistance. Plan: Continue per plan of care. Precautions Right Knee TKR       Manuals 10/19 10/4 10/9 10/12 10/16   Right knee PROM and stretch KL                               Neuro Re-Ed                                                                 Ther Ex        Nustep L1 5 min L3 10 min      TR/HR 2x10 2x10 2x10 2x10  2x10   Standing SLR 3-way Abd 2x10 B/L Abd, Ext 2x10 B/L Abd 2x10 Abd 2x10  Abd 2x10 B/L   LAQ 2x10 2x10  2x10  2x10   Seated HS curls with TB RTB 2x10   Red TB 2x10  RTB 2x10   QS 2x10 3" hold  2x10 3" hold 2x10 3" hold 2x10 3" hold   SAQ 2x10   2x10  2x10   SLR 10x Assist       Heel Slides 2x10  2x10 2x10  2x10   Standing HS curl 2x10   20x  20x   Hip abd RTB 2x10 singles   Red TB 2x10 singles  RTB 2x10 singles   Hip add 20x3" hold   20x 3" hold  20x3" hold   Ther Activity                        Gait Training                        Modalities                            Access Code: [email or web address]  URL: [email or web address]  Date: 2023  Prepared by: Terrance Quintero    Exercises  - Supine Quad Set  - 1 x daily - 7 x weekly - 3 sets - 10 reps - 5 second hold  - Supine Heel Slide  - 1 x daily - 7 x weekly - 3 sets - 10 reps  - Small Range Straight Leg Raise  - 1 x daily - 7 x weekly - 3 sets - 10 reps  - Supine Knee Extension Strengthening  - 1 x daily - 7 x weekly - 3 sets - 10 reps  - Seated Long Arc Quad  - 1 x daily - 7 x weekly - 3 sets - 10 reps

## 2023-10-23 ENCOUNTER — OFFICE VISIT (OUTPATIENT)
Dept: PHYSICAL THERAPY | Facility: CLINIC | Age: 73
End: 2023-10-23
Payer: MEDICARE

## 2023-10-23 DIAGNOSIS — M17.11 UNILATERAL PRIMARY OSTEOARTHRITIS, RIGHT KNEE: Primary | ICD-10-CM

## 2023-10-23 PROCEDURE — 97110 THERAPEUTIC EXERCISES: CPT | Performed by: PHYSICAL THERAPIST

## 2023-10-23 NOTE — PROGRESS NOTES
Daily Note     Today's date: 10/23/2023  Patient name: Kathy Aguilera  :   MRN: [id number]  Referring provider: Jimmie Hernandez MD  Dx:   Encounter Diagnosis     ICD-10-CM    1. Unilateral primary osteoarthritis, right knee  M17.11                      Subjective: Patient states "I'm doing pretty good". Objective: See treatment diary below      Assessment: Tolerated treatment well. Patient exhibited good technique with therapeutic exercises. Strength is improving well t/o right knee. Plan: Continue per plan of care. Precautions Right Knee TKR       Manuals 10/19 10/23 10/9 10/12 10/16   Right knee PROM and stretch KL                               Neuro Re-Ed                                                                 Ther Ex        Nustep L1 5 min L3 10 min      TR/HR 2x10 2x10 2x10 2x10  2x10   Standing SLR 3-way Abd 2x10 B/L 2x10 B/L, each Abd 2x10 Abd 2x10  Abd 2x10 B/L   LAQ 2x10 2x10  2x10  2x10   Seated HS curls with TB RTB 2x10 RTB 2x10  Red TB 2x10  RTB 2x10   QS 2x10 3" hold 2x10 3" hold 2x10 3" hold 2x10 3" hold 2x10 3" hold   SAQ 2x10 2x10  2x10  2x10   SLR 10x Assist 10x       Heel Slides 2x10 2x10 2x10 2x10  2x10   Standing HS curl 2x10 2x10  20x  20x   Hip abd RTB 2x10 singles RTB 2x10 singles  Red TB 2x10 singles  RTB 2x10 singles   Hip add 20x3" hold 20x3" hold  20x 3" hold  20x3" hold   Ther Activity                        Gait Training                        Modalities                            Access Code: [email or web address]  URL: [email or web address]  Date: 2023  Prepared by: Timmothy Ganser    Exercises  - Supine Quad Set  - 1 x daily - 7 x weekly - 3 sets - 10 reps - 5 second hold  - Supine Heel Slide  - 1 x daily - 7 x weekly - 3 sets - 10 reps  - Small Range Straight Leg Raise  - 1 x daily - 7 x weekly - 3 sets - 10 reps  - Supine Knee Extension Strengthening  - 1 x daily - 7 x weekly - 3 sets - 10 reps  - Seated Long Arc Quad  - 1 x daily - 7 x weekly - 3 sets - 10 reps

## 2023-10-26 ENCOUNTER — OFFICE VISIT (OUTPATIENT)
Dept: PHYSICAL THERAPY | Facility: CLINIC | Age: 73
End: 2023-10-26
Payer: MEDICARE

## 2023-10-26 DIAGNOSIS — M17.11 UNILATERAL PRIMARY OSTEOARTHRITIS, RIGHT KNEE: Primary | ICD-10-CM

## 2023-10-26 PROCEDURE — 97110 THERAPEUTIC EXERCISES: CPT | Performed by: PHYSICAL THERAPIST

## 2023-10-26 NOTE — PROGRESS NOTES
Daily Note     Today's date: 10/26/2023  Patient name: Kathy Aguilera  :   MRN: [id number]  Referring provider: Jimmie Hernandez MD  Dx:   Encounter Diagnosis     ICD-10-CM    1. Unilateral primary osteoarthritis, right knee  M17.11                      Subjective: Patient reports she gets some pain in her thigh area. Objective: See treatment diary below      Assessment: Tolerated treatment well. Patient exhibited good technique with therapeutic exercises. Right knee flexion ROM to 100 degrees today. Plan: Continue per plan of care. Precautions Right Knee TKR       Manuals 10/19 10/23 10/26 10/12 10/16   Right knee PROM and stretch KL                               Neuro Re-Ed                                                                 Ther Ex        Nustep L1 5 min L3 10 min L3 10 min     TR/HR 2x10 2x10 2x10 2x10  2x10   Standing SLR 3-way Abd 2x10 B/L 2x10 B/L, each 2x10 B/L, each Abd 2x10  Abd 2x10 B/L   LAQ 2x10 2x10 2x10 2x10  2x10   Seated HS curls with TB RTB 2x10 RTB 2x10 RTB 2x10 Red TB 2x10  RTB 2x10   QS 2x10 3" hold 2x10 3" hold 3x10 3" hold 2x10 3" hold 2x10 3" hold   SAQ 2x10 2x10 3x10 2x10  2x10   SLR 10x Assist 10x  15x     Heel Slides 2x10 2x10 2x10 2x10  2x10   Standing HS curl 2x10 2x10 2x10 20x  20x   Hip abd RTB 2x10 singles RTB 2x10 singles RTB 2x10 singles Red TB 2x10 singles  RTB 2x10 singles   Hip add 20x3" hold 20x3" hold 20x3" hold 20x 3" hold  20x3" hold                           Ther Activity                        Gait Training                        Modalities                            Access Code: [email or web address]  URL: [email or web address]  Date: 2023  Prepared by: Timmothy Ganser    Exercises  - Supine Quad Set  - 1 x daily - 7 x weekly - 3 sets - 10 reps - 5 second hold  - Supine Heel Slide  - 1 x daily - 7 x weekly - 3 sets - 10 reps  - Small Range Straight Leg Raise  - 1 x daily - 7 x weekly - 3 sets - 10 reps  - Supine Knee Extension Strengthening  - 1 x daily - 7 x weekly - 3 sets - 10 reps  - Seated Long Arc Quad  - 1 x daily - 7 x weekly - 3 sets - 10 reps

## 2023-10-30 ENCOUNTER — OFFICE VISIT (OUTPATIENT)
Dept: PHYSICAL THERAPY | Facility: CLINIC | Age: 73
End: 2023-10-30
Payer: MEDICARE

## 2023-10-30 DIAGNOSIS — M17.11 UNILATERAL PRIMARY OSTEOARTHRITIS, RIGHT KNEE: Primary | ICD-10-CM

## 2023-10-30 PROCEDURE — 97110 THERAPEUTIC EXERCISES: CPT

## 2023-10-30 NOTE — PROGRESS NOTES
Daily Note     Today's date: 10/30/2023  Patient name: Adilia Wasserman  : [birth year]  MRN: [id number]  Referring provider: Neomia Runner, MD  Dx:   Encounter Diagnosis     ICD-10-CM    1. Unilateral primary osteoarthritis, right knee  M17.11                      Subjective: Patient reports right knee is a little achy but overall is doing well. Objective: See treatment diary below      Assessment: Tolerated treatment well. Patient exhibited good technique with therapeutic exercises ROM is improving well in flexion and extension. VC's needed for proper performance of quad sets. Plan: Continue per plan of care. Precautions Right Knee TKR       Manuals 10/19 10/23 10/26 10/30 10/16   Right knee PROM and stretch KL                               Neuro Re-Ed                                                                 Ther Ex        Nustep L1 5 min L3 10 min L3 10 min SRC pend 10 min    TR/HR 2x10 2x10 2x10 2x10  2x10   Standing SLR 3-way Abd 2x10 B/L 2x10 B/L, each 2x10 B/L, each 2x10  B each Abd 2x10 B/L   LAQ 2x10 2x10 2x10 2x10  2x10   Seated HS curls with TB RTB 2x10 RTB 2x10 RTB 2x10 GTB 2x10  RTB 2x10   QS 2x10 3" hold 2x10 3" hold 3x10 3" hold 3x10 3" hold 2x10 3" hold   SAQ 2x10 2x10 3x10 3x10  2x10   SLR 10x Assist 10x  15x 2x10    Heel Slides 2x10 2x10 2x10 2x10  2x10   Standing HS curl 2x10 2x10 2x10 20x  20x   Hip abd RTB 2x10 singles RTB 2x10 singles RTB 2x10 singles GTB 2x10 singles  RTB 2x10 singles   Hip add 20x3" hold 20x3" hold 20x3" hold 20x 3" hold  20x3" hold                           Ther Activity                        Gait Training                        Modalities                            Access Code: [email or web address]  URL: [email or web address]  Date: 2023  Prepared by: Danelle Vera    Exercises  - Supine Quad Set  - 1 x daily - 7 x weekly - 3 sets - 10 reps - 5 second hold  - Supine Heel Slide  - 1 x daily - 7 x weekly - 3 sets - 10 reps  - Small Range Straight Leg Raise  - 1 x daily - 7 x weekly - 3 sets - 10 reps  - Supine Knee Extension Strengthening  - 1 x daily - 7 x weekly - 3 sets - 10 reps  - Seated Long Arc Quad  - 1 x daily - 7 x weekly - 3 sets - 10 reps

## 2023-11-02 ENCOUNTER — OFFICE VISIT (OUTPATIENT)
Dept: PHYSICAL THERAPY | Facility: CLINIC | Age: 73
End: 2023-11-02
Payer: MEDICARE

## 2023-11-02 DIAGNOSIS — M17.11 UNILATERAL PRIMARY OSTEOARTHRITIS, RIGHT KNEE: Primary | ICD-10-CM

## 2023-11-02 PROCEDURE — 97110 THERAPEUTIC EXERCISES: CPT

## 2023-11-02 NOTE — PROGRESS NOTES
Daily Note     Today's date: 2023  Patient name: Dina Bill  :   MRN: [id number]  Referring provider: Demetrio Montoya MD  Dx:   Encounter Diagnosis     ICD-10-CM    1. Unilateral primary osteoarthritis, right knee  M17.11                      Subjective: Patient reports right knee is doing well, has some pain with knee flexion. Patient reports has difficulty with stairs. Objective: See treatment diary below      Assessment: Tolerated treatment well. Patient exhibited good technique with therapeutic exercises Added step ups and stair training to program today. Plan: Continue per plan of care. Precautions Right Knee TKR       Manuals 10/19 10/23 10/26 10/30 11/2   Right knee PROM and stretch KL                               Neuro Re-Ed                                                                 Ther Ex        Nustep L1 5 min L3 10 min L3 10 min SRC pend 10 min Nustep 6 min  SRC-pend 5 min   TR/HR 2x10 2x10 2x10 2x10  2x10   Standing SLR 3-way Abd 2x10 B/L 2x10 B/L, each 2x10 B/L, each 2x10  B each R only 1#  2x10 B/L   LAQ 2x10 2x10 2x10 2x10  1# 2x10   Seated HS curls with TB RTB 2x10 RTB 2x10 RTB 2x10 GTB 2x10  GTB 2x10   QS 2x10 3" hold 2x10 3" hold 3x10 3" hold 3x10 3" hold 2x10 3" hold   SAQ 2x10 2x10 3x10 3x10  2x10   SLR 10x Assist 10x  15x 2x10    Heel Slides 2x10 2x10 2x10 2x10  2x10   Standing HS curl 2x10 2x10 2x10 20x  20x   Hip abd RTB 2x10 singles RTB 2x10 singles RTB 2x10 singles GTB 2x10 singles  RTB 2x10 singles   Hip add 20x3" hold 20x3" hold 20x3" hold 20x 3" hold  20x3" hold                           Ther Activity                        Gait Training                        Modalities                            Access Code: [email or web address]  URL: [email or web address]  Date: 2023  Prepared by: Radha Dickson    Exercises  - Supine Quad Set  - 1 x daily - 7 x weekly - 3 sets - 10 reps - 5 second hold  - Supine Heel Slide  - 1 x daily - 7 x weekly - 3 sets - 10 reps  - Small Range Straight Leg Raise  - 1 x daily - 7 x weekly - 3 sets - 10 reps  - Supine Knee Extension Strengthening  - 1 x daily - 7 x weekly - 3 sets - 10 reps  - Seated Long Arc Quad  - 1 x daily - 7 x weekly - 3 sets - 10 reps

## 2023-11-06 ENCOUNTER — OFFICE VISIT (OUTPATIENT)
Dept: PHYSICAL THERAPY | Facility: CLINIC | Age: 73
End: 2023-11-06
Payer: MEDICARE

## 2023-11-06 DIAGNOSIS — M17.11 UNILATERAL PRIMARY OSTEOARTHRITIS, RIGHT KNEE: Primary | ICD-10-CM

## 2023-11-06 PROCEDURE — 97110 THERAPEUTIC EXERCISES: CPT | Performed by: PHYSICAL THERAPIST

## 2023-11-06 NOTE — PROGRESS NOTES
Daily Note     Today's date: 2023  Patient name: Amber Eastman  : [birth year]  MRN: [id number]  Referring provider: Lloyd Myers MD  Dx:   Encounter Diagnosis     ICD-10-CM    1. Unilateral primary osteoarthritis, right knee  M17.11                      Subjective: Patient without new c/o today. Objective: See treatment diary below      Assessment: Tolerated treatment well. Patient exhibited good technique with therapeutic exercises      Plan: Continue per plan of care. Precautions Right Knee TKR       Manuals 11/6 10/23 10/26 10/30 11/2   Right knee PROM and stretch                                Neuro Re-Ed                                                                 Ther Ex        Nustep L1 8 min  SRC 6 min pend L3 10 min L3 10 min SRC pend 10 min Nustep 6 min  SRC-pend 5 min   TR/HR 2x10 2x10 2x10 2x10  2x10   Standing SLR 3-way 1# R only, each 2x10 B/L, each 2x10 B/L, each 2x10  B each R only 1#  2x10 B/L   LAQ 2x10 2x10 2x10 2x10  1# 2x10   Seated HS curls with TB GTB 2x10 RTB 2x10 RTB 2x10 GTB 2x10  GTB 2x10   QS 3x10 3" hold 2x10 3" hold 3x10 3" hold 3x10 3" hold 2x10 3" hold   SAQ 3x10 2x10 3x10 3x10  2x10   SLR 20x  10x  15x 2x10    Heel Slides 2x10 2x10 2x10 2x10  2x10   Standing HS curl 2x10 2x10 2x10 20x  20x   Hip abd RTB 2x10 singles RTB 2x10 singles RTB 2x10 singles GTB 2x10 singles  RTB 2x10 singles   Hip add 20x3" hold 20x3" hold 20x3" hold 20x 3" hold  20x3" hold                           Ther Activity                        Gait Training                        Modalities                            Access Code: [email or web address]  URL: [email or web address]  Date: 2023  Prepared by: Ant Fernández    Exercises  - Supine Quad Set  - 1 x daily - 7 x weekly - 3 sets - 10 reps - 5 second hold  - Supine Heel Slide  - 1 x daily - 7 x weekly - 3 sets - 10 reps  - Small Range Straight Leg Raise  - 1 x daily - 7 x weekly - 3 sets - 10 reps  - Supine Knee Extension Strengthening  - 1 x daily - 7 x weekly - 3 sets - 10 reps  - Seated Long Arc Quad  - 1 x daily - 7 x weekly - 3 sets - 10 reps

## 2023-11-09 ENCOUNTER — OFFICE VISIT (OUTPATIENT)
Dept: PHYSICAL THERAPY | Facility: CLINIC | Age: 73
End: 2023-11-09
Payer: MEDICARE

## 2023-11-09 DIAGNOSIS — M17.11 UNILATERAL PRIMARY OSTEOARTHRITIS, RIGHT KNEE: Primary | ICD-10-CM

## 2023-11-09 PROCEDURE — 97110 THERAPEUTIC EXERCISES: CPT

## 2023-11-09 NOTE — PROGRESS NOTES
Daily Note     Today's date: 2023  Patient name: Alaina Carlos  : [id number]  MRN: [id number]  Referring provider: Carmen Agosto MD  Dx:   Encounter Diagnosis     ICD-10-CM    1. Unilateral primary osteoarthritis, right knee  M17.11           Start Time: 1255  Stop Time: 1330  Total time in clinic (min): 35 minutes    Subjective: Patient stated having no discomfort in her knee at this time. She stated that she has been able to ascend stairs with reciprocal pattern. Objective: See treatment diary below      Assessment: Tolerated treatment well. Patient completed TE with added intensity to standing and supine exercises ; no changes in knee symptoms during or post treat. She demonstrated good technique with exercises. Plan: Continue per plan of care. Precautions Right Knee TKR       Manuals 11/6 11/9 10/26 10/30 11/2   Right knee PROM and stretch                                Neuro Re-Ed                                                                 Ther Ex        Nustep L1 8 min  SRC 6 min pend L3 10 min L3 10 min SRC pend 10 min Nustep 6 min  SRC-pend 5 min   TR/HR 2x10 2x10 2x10 2x10  2x10   Standing SLR 3-way 1# R only, each 1# 2x10 B/L, each 2x10 B/L, each 2x10  B each R only 1#  2x10 B/L   LAQ 2x10 1# 2x10 2x10 2x10  1# 2x10   Seated HS curls with TB GTB 2x10 GTB 2x10 RTB 2x10 GTB 2x10  GTB 2x10   QS 3x10 3" hold 2x10 3" hold 3x10 3" hold 3x10 3" hold 2x10 3" hold   SAQ 3x10 1# 2x10 3x10 3x10  2x10   SLR 20x  1# 20x  15x 2x10    Heel Slides 2x10 1# 2x10 2x10 2x10  2x10   Standing HS curl 2x10 1# 2x10 2x10 20x  20x   Hip abd RTB 2x10 singles GTB 2x10 singles RTB 2x10 singles GTB 2x10 singles  RTB 2x10 singles   Hip add 20x3" hold 20x3" hold 20x3" hold 20x 3" hold  20x3" hold                           Ther Activity                        Gait Training                        Modalities                            Access Code: [email or web address]  URL: [email or web address]  Date: 09/27/2023  Prepared by: Antoinette Nikita    Exercises  - Supine Quad Set  - 1 x daily - 7 x weekly - 3 sets - 10 reps - 5 second hold  - Supine Heel Slide  - 1 x daily - 7 x weekly - 3 sets - 10 reps  - Small Range Straight Leg Raise  - 1 x daily - 7 x weekly - 3 sets - 10 reps  - Supine Knee Extension Strengthening  - 1 x daily - 7 x weekly - 3 sets - 10 reps  - Seated Long Arc Quad  - 1 x daily - 7 x weekly - 3 sets - 10 reps

## 2023-11-13 ENCOUNTER — OFFICE VISIT (OUTPATIENT)
Dept: PHYSICAL THERAPY | Facility: CLINIC | Age: 73
End: 2023-11-13
Payer: MEDICARE

## 2023-11-13 DIAGNOSIS — M17.11 UNILATERAL PRIMARY OSTEOARTHRITIS, RIGHT KNEE: Primary | ICD-10-CM

## 2023-11-13 PROCEDURE — 97110 THERAPEUTIC EXERCISES: CPT | Performed by: PHYSICAL THERAPIST

## 2023-11-13 NOTE — PROGRESS NOTES
Daily Note     Today's date: 2023  Patient name: Erich Hayes  :   MRN: [id number]  Referring provider: Brandon Raymundo MD  Dx:   Encounter Diagnosis     ICD-10-CM    1. Unilateral primary osteoarthritis, right knee  M17.11                      Subjective: Patient states "I'm doing ok today". Objective: See treatment diary below      Assessment: Tolerated treatment well. Patient exhibited good technique with therapeutic exercises. Patient is showing good progress with right knee rom and strength. Plan: Continue per plan of care. Precautions Right Knee TKR       Manuals 11/6 11/9 11/13 10/30 11/2   Right knee PROM and stretch                                Neuro Re-Ed                                                                 Ther Ex        Nustep L1 8 min  SRC 6 min pend L3 10 min L3 10 min SRC pend 10 min Nustep 6 min  SRC-pend 5 min   TR/HR 2x10 2x10 2x10 2x10  2x10   Standing SLR 3-way 1# R only, each 1# 2x10 B/L, each 1# 2x10 B/L, each 2x10  B each R only 1#  2x10 B/L   LAQ 2x10 1# 2x10 1# 2x10 2x10  1# 2x10   Seated HS curls with TB GTB 2x10 GTB 2x10 GTB 2x10 GTB 2x10  GTB 2x10   QS 3x10 3" hold 2x10 3" hold 3x10 3" hold 3x10 3" hold 2x10 3" hold   SAQ 3x10 1# 2x10 1# 3x10 3x10  2x10   SLR 20x  1# 20x  1# 20x 2x10    Heel Slides 2x10 1# 2x10 1# 2x10 2x10  2x10   Standing HS curl 2x10 1# 2x10 1# 2x10 20x  20x   Hip abd RTB 2x10 singles GTB 2x10 singles GTB 2x10 singles GTB 2x10 singles  RTB 2x10 singles   Hip add 20x3" hold 20x3" hold 20x3" hold 20x 3" hold  20x3" hold                           Ther Activity                        Gait Training                        Modalities                            Access Code: [email or web address]  URL: [email or web address]  Date: 2023  Prepared by: Sara Jose    Exercises  - Supine Quad Set  - 1 x daily - 7 x weekly - 3 sets - 10 reps - 5 second hold  - Supine Heel Slide  - 1 x daily - 7 x weekly - 3 sets - 10 reps  - Small Range Straight Leg Raise  - 1 x daily - 7 x weekly - 3 sets - 10 reps  - Supine Knee Extension Strengthening  - 1 x daily - 7 x weekly - 3 sets - 10 reps  - Seated Long Arc Quad  - 1 x daily - 7 x weekly - 3 sets - 10 reps

## 2023-11-16 ENCOUNTER — OFFICE VISIT (OUTPATIENT)
Dept: PHYSICAL THERAPY | Facility: CLINIC | Age: 73
End: 2023-11-16
Payer: MEDICARE

## 2023-11-16 DIAGNOSIS — M17.11 UNILATERAL PRIMARY OSTEOARTHRITIS, RIGHT KNEE: Primary | ICD-10-CM

## 2023-11-16 PROCEDURE — 97110 THERAPEUTIC EXERCISES: CPT

## 2023-11-16 NOTE — PROGRESS NOTES
Daily Note     Today's date: 2023  Patient name: Konrad Napier  : [id number]  MRN: [id number]  Referring provider: Deborah Markham MD  Dx:   Encounter Diagnosis     ICD-10-CM    1. Unilateral primary osteoarthritis, right knee  M17.11           Start Time: 1300  Stop Time: 1340  Total time in clinic (min): 40 minutes    Subjective: Patient reported "doing pretty good" today. Objective: See treatment diary below      Assessment: Tolerated treatment well. Patient exhibited good technique with therapeutic exercises, including increased intensity with exercises. Good technique demonstrated throughout. Continues to benefit from further PT to maximize functional independence. Plan: Continue per plan of care. Precautions Right Knee TKR       Manuals    Right knee PROM and stretch                                Neuro Re-Ed                                                                 Ther Ex        Nustep L1 8 min  SRC 6 min pend L3 10 min L3 10 min L3 10 min  Nustep 6 min  SRC-pend 5 min   TR/HR 2x10 2x10 2x10 2# 2x10  2x10   Standing SLR 3-way 1# R only, each 1# 2x10 B/L, each 1# 2x10 B/L, each 2# 2x10  B each R only 1#  2x10 B/L   LAQ 2x10 1# 2x10 1# 2x10 2# 2x10  1# 2x10   Seated HS curls with TB GTB 2x10 GTB 2x10 GTB 2x10 GTB 2x10  GTB 2x10   QS 3x10 3" hold 2x10 3" hold 3x10 3" hold 3x10 3" hold 2x10 3" hold   SAQ 3x10 1# 2x10 1# 3x10 2# 3x10  2x10   SLR 20x  1# 20x  1# 20x 2# 2x10    Heel Slides 2x10 1# 2x10 1# 2x10 2# 2x10  2x10   Standing HS curl 2x10 1# 2x10 1# 2x10 2# 20x  20x   Hip abd RTB 2x10 singles GTB 2x10 singles GTB 2x10 singles GTB 2x10 singles  RTB 2x10 singles   Hip add 20x3" hold 20x3" hold 20x3" hold 20x 3" hold  20x3" hold                           Ther Activity                        Gait Training                        Modalities                            Access Code: [email or web address]  URL: [email or web address]  Date: 2023  Prepared by: Cristo Shah    Exercises  - Supine Quad Set  - 1 x daily - 7 x weekly - 3 sets - 10 reps - 5 second hold  - Supine Heel Slide  - 1 x daily - 7 x weekly - 3 sets - 10 reps  - Small Range Straight Leg Raise  - 1 x daily - 7 x weekly - 3 sets - 10 reps  - Supine Knee Extension Strengthening  - 1 x daily - 7 x weekly - 3 sets - 10 reps  - Seated Long Arc Quad  - 1 x daily - 7 x weekly - 3 sets - 10 reps

## 2023-11-20 ENCOUNTER — OFFICE VISIT (OUTPATIENT)
Dept: PHYSICAL THERAPY | Facility: CLINIC | Age: 73
End: 2023-11-20
Payer: MEDICARE

## 2023-11-20 DIAGNOSIS — M17.11 UNILATERAL PRIMARY OSTEOARTHRITIS, RIGHT KNEE: Primary | ICD-10-CM

## 2023-11-20 PROCEDURE — 97110 THERAPEUTIC EXERCISES: CPT

## 2023-11-20 NOTE — PROGRESS NOTES
Daily Note     Today's date: 2023  Patient name: Priscilla Cote  :   MRN: [id number]  Referring provider: Malena Ecsamilla MD  Dx:   Encounter Diagnosis     ICD-10-CM    1. Unilateral primary osteoarthritis, right knee  M17.11                      Subjective: Patient reports right knee is doing well. Objective: See treatment diary below      Assessment: Tolerated treatment well. Patient exhibited good technique with therapeutic exercises, AROM 107 degrees knee flexion      Plan: Continue per plan of care. Precautions Right Knee TKR       Manuals    Right knee PROM and stretch                                Neuro Re-Ed                                                                 Ther Ex        Nustep L1 8 min  SRC 6 min pend L3 10 min L3 10 min L3 10 min    SRC-10 min   TR/HR 2x10 2x10 2x10 2# 2x10  2x10   Standing SLR 3-way 1# R only, each 1# 2x10 B/L, each 1# 2x10 B/L, each 2# 2x10  B each R only 1#  2x10 B/L   LAQ 2x10 1# 2x10 1# 2x10 2# 2x10  1# 2x10   Seated HS curls with TB GTB 2x10 GTB 2x10 GTB 2x10 GTB 2x10  GTB 2x10   QS 3x10 3" hold 2x10 3" hold 3x10 3" hold 3x10 3" hold 2x10 3" hold   SAQ 3x10 1# 2x10 1# 3x10 2# 3x10  2x10   SLR 20x  1# 20x  1# 20x 2# 2x10    Heel Slides 2x10 1# 2x10 1# 2x10 2# 2x10  2x10   Standing HS curl 2x10 1# 2x10 1# 2x10 2# 20x  20x   Hip abd RTB 2x10 singles GTB 2x10 singles GTB 2x10 singles GTB 2x10 singles  GTB 2x10 singles   Hip add 20x3" hold 20x3" hold 20x3" hold 20x 3" hold  20x3" hold                           Ther Activity                        Gait Training                        Modalities                            Access Code: [email or web address]  URL: [email or web address]  Date: 2023  Prepared by: Nakul Parra    Exercises  - Supine Quad Set  - 1 x daily - 7 x weekly - 3 sets - 10 reps - 5 second hold  - Supine Heel Slide  - 1 x daily - 7 x weekly - 3 sets - 10 reps  - Small Range Straight Leg Raise  - 1 x daily - 7 x weekly - 3 sets - 10 reps  - Supine Knee Extension Strengthening  - 1 x daily - 7 x weekly - 3 sets - 10 reps  - Seated Long Arc Quad  - 1 x daily - 7 x weekly - 3 sets - 10 reps

## 2023-11-27 ENCOUNTER — OFFICE VISIT (OUTPATIENT)
Dept: PHYSICAL THERAPY | Facility: CLINIC | Age: 73
End: 2023-11-27
Payer: MEDICARE

## 2023-11-27 DIAGNOSIS — M17.11 UNILATERAL PRIMARY OSTEOARTHRITIS, RIGHT KNEE: Primary | ICD-10-CM

## 2023-11-27 PROCEDURE — 97110 THERAPEUTIC EXERCISES: CPT

## 2023-11-27 NOTE — PROGRESS NOTES
Daily Note     Today's date: 2023  Patient name: Dia Patterson  :   MRN: [id number]  Referring provider: Melany Mccann MD  Dx:   Encounter Diagnosis     ICD-10-CM    1. Unilateral primary osteoarthritis, right knee  M17.11                      Subjective: Patient reports right knee is doing well. Patient reports getting better with stairs. Objective: See treatment diary below      Assessment: Tolerated treatment well. Patient exhibited good technique with therapeutic exercises Strength is improving in right knee. Plan: Continue per plan of care. Precautions Right Knee TKR       Manuals    Right knee PROM and stretch                                Neuro Re-Ed                                                                 Ther Ex        Nustep   SRC 10 min L3 10 min L3 10 min L3 10 min    SRC-10 min   TR/HR 2x10 2x10 2x10 2# 2x10  2x10   Standing SLR 3-way 2# R only, each 1# 2x10 B/L, each 1# 2x10 B/L, each 2# 2x10  B each R only 1#  2x10 B/L   LAQ 2x10 1# 2x10 1# 2x10 2# 2x10  1# 2x10   Seated HS curls with TB GTB 2x10 GTB 2x10 GTB 2x10 GTB 2x10  GTB 2x10   QS 3x10 3" hold 2x10 3" hold 3x10 3" hold 3x10 3" hold 2x10 3" hold   SAQ 2# 3x10 1# 2x10 1# 3x10 2# 3x10  2x10   SLR 2# 2x10  1# 20x  1# 20x 2# 2x10    Heel Slides 2# 2x10 1# 2x10 1# 2x10 2# 2x10  2x10   Standing HS curl 2# 2x10 1# 2x10 1# 2x10 2# 20x  20x   Hip abd GTB 2x10 singles GTB 2x10 singles GTB 2x10 singles GTB 2x10 singles  GTB 2x10 singles   Hip add 20x3" hold 20x3" hold 20x3" hold 20x 3" hold  20x3" hold   Step ups  2x10 F/L 6"                       Ther Activity                        Gait Training                        Modalities                            Access Code: [email or web address]  URL: [email or web address]  Date: 2023  Prepared by: Amy Garcia    Exercises  - Supine Quad Set  - 1 x daily - 7 x weekly - 3 sets - 10 reps - 5 second hold  - Supine Heel Slide  - 1 x daily - 7 x weekly - 3 sets - 10 reps  - Small Range Straight Leg Raise  - 1 x daily - 7 x weekly - 3 sets - 10 reps  - Supine Knee Extension Strengthening  - 1 x daily - 7 x weekly - 3 sets - 10 reps  - Seated Long Arc Quad  - 1 x daily - 7 x weekly - 3 sets - 10 reps

## 2023-11-30 ENCOUNTER — OFFICE VISIT (OUTPATIENT)
Dept: PHYSICAL THERAPY | Facility: CLINIC | Age: 73
End: 2023-11-30
Payer: MEDICARE

## 2023-11-30 DIAGNOSIS — M17.11 UNILATERAL PRIMARY OSTEOARTHRITIS, RIGHT KNEE: Primary | ICD-10-CM

## 2023-11-30 PROCEDURE — 97110 THERAPEUTIC EXERCISES: CPT

## 2023-11-30 NOTE — PROGRESS NOTES
Daily Note     Today's date: 2023  Patient name: Adilia Wasserman  : [birth year]  MRN: [id number]  Referring provider: Neomia Runner, MD  Dx:   Encounter Diagnosis     ICD-10-CM    1. Unilateral primary osteoarthritis, right knee  M17.11           Start Time: 1300  Stop Time: 1345  Total time in clinic (min): 45 minutes    Subjective: Patient indicated that her knee is feeling really good today. Objective: See treatment diary below. Assessment: Therapeutic exercise program was completed with good technique and no previous pain or symptoms. Continue to recommend PT in order to achieve maximal functional independence. Plan: Continue per plan of care. Precautions Right Knee TKR       Manuals    Right knee PROM and stretch                                Neuro Re-Ed                                                                 Ther Ex        Nustep   SRC 10 min L3 10 min L3 10 min L3 10 min    SRC-10 min   TR/HR 2x10 2x10 2x10 2# 2x10  2x10   Standing SLR 3-way 2# R only, each 2# 2x10 B/L, each 1# 2x10 B/L, each 2# 2x10  B each R only 1#  2x10 B/L   LAQ 2x10 2# 2x10 1# 2x10 2# 2x10  1# 2x10   Seated HS curls with TB GTB 2x10 GTB 2x10 GTB 2x10 GTB 2x10  GTB 2x10   QS 3x10 3" hold 3x10 3" hold 3x10 3" hold 3x10 3" hold 2x10 3" hold   SAQ 2# 3x10 2# 2x10 1# 3x10 2# 3x10  2x10   SLR 2# 2x10  2# 20x  1# 20x 2# 2x10    Heel Slides 2# 2x10 2# 2x10 1# 2x10 2# 2x10  2x10   Standing HS curl 2# 2x10 2# 2x10 1# 2x10 2# 20x  20x   Hip abd GTB 2x10 singles GTB 2x10 singles GTB 2x10 singles GTB 2x10 singles  GTB 2x10 singles   Hip add 20x3" hold 20x3" hold 20x3" hold 20x 3" hold  20x3" hold   Step ups  2x10 F/L 6"  2x10 F/L 6"                       Ther Activity                        Gait Training                        Modalities                            Access Code: [email or web address]  URL: [email or web address]  Date: 2023  Prepared by: Tiffanie Cope Maciej    Exercises  - Supine Quad Set  - 1 x daily - 7 x weekly - 3 sets - 10 reps - 5 second hold  - Supine Heel Slide  - 1 x daily - 7 x weekly - 3 sets - 10 reps  - Small Range Straight Leg Raise  - 1 x daily - 7 x weekly - 3 sets - 10 reps  - Supine Knee Extension Strengthening  - 1 x daily - 7 x weekly - 3 sets - 10 reps  - Seated Long Arc Quad  - 1 x daily - 7 x weekly - 3 sets - 10 reps

## 2023-12-04 ENCOUNTER — OFFICE VISIT (OUTPATIENT)
Dept: PHYSICAL THERAPY | Facility: CLINIC | Age: 73
End: 2023-12-04
Payer: MEDICARE

## 2023-12-04 DIAGNOSIS — M17.11 UNILATERAL PRIMARY OSTEOARTHRITIS, RIGHT KNEE: Primary | ICD-10-CM

## 2023-12-04 PROCEDURE — 97110 THERAPEUTIC EXERCISES: CPT

## 2023-12-04 NOTE — PROGRESS NOTES
Daily Note     Today's date: 2023  Patient name: Jaclyn Abdalla  :   MRN: [id number]  Referring provider: Rochelle Garcia MD  Dx:   Encounter Diagnosis     ICD-10-CM    1. Unilateral primary osteoarthritis, right knee  M17.11                      Subjective: Patient reports right knee is doing well. Objective: See treatment diary below      Assessment: Tolerated treatment well. Patient exhibited good technique with therapeutic exercises      Plan:  D/C to [facility]     Precautions Right Knee TKR       Manuals    Right knee PROM and stretch                                Neuro Re-Ed                                                                 Ther Ex        Nustep   SRC 10 min L3 10 min L3 10 min L3 10 min    SRC-10 min   TR/HR 2x10 2x10 2x10 2# 2x10  2x10   Standing SLR 3-way 2# R only, each 2# 2x10 B/L, each 1# 2x10 B/L, each 2# 2x10  B each R only 1#  2x10 B/L   LAQ 2x10 2# 2x10 1# 2x10 2# 2x10  1# 2x10   Seated HS curls with TB GTB 2x10 GTB 2x10 GTB 2x10 GTB 2x10  GTB 2x10   QS 3x10 3" hold 3x10 3" hold 3x10 3" hold 3x10 3" hold 2x10 3" hold   SAQ 2# 3x10 2# 2x10 1# 3x10 2# 3x10  2x10   SLR 2# 2x10  2# 20x  1# 20x 2# 2x10    Heel Slides 2# 2x10 2# 2x10 1# 2x10 2# 2x10  2x10   Standing HS curl 2# 2x10 2# 2x10 1# 2x10 2# 20x  20x   Hip abd GTB 2x10 singles GTB 2x10 singles GTB 2x10 singles GTB 2x10 singles  GTB 2x10 singles   Hip add 20x3" hold 20x3" hold 20x3" hold 20x 3" hold  20x3" hold   Step ups  2x10 F/L 6"  2x10 F/L 6"  2x10 f/l 6"                     Ther Activity                        Gait Training                        Modalities                            Access Code: [email or web address]  URL: [email or web address]  Date: 2023  Prepared by: Eunice Smith    Exercises  - Supine Quad Set  - 1 x daily - 7 x weekly - 3 sets - 10 reps - 5 second hold  - Supine Heel Slide  - 1 x daily - 7 x weekly - 3 sets - 10 reps  - Small Range Straight Leg Raise  - 1 x daily - 7 x weekly - 3 sets - 10 reps  - Supine Knee Extension Strengthening  - 1 x daily - 7 x weekly - 3 sets - 10 reps  - Seated Long Arc Quad  - 1 x daily - 7 x weekly - 3 sets - 10 reps

## 2024-03-08 ENCOUNTER — DOCTOR'S OFFICE (OUTPATIENT)
Dept: URBAN - NONMETROPOLITAN AREA CLINIC 1 | Facility: CLINIC | Age: 74
Setting detail: OPHTHALMOLOGY
End: 2024-03-08
Payer: MEDICARE

## 2024-03-08 VITALS — HEIGHT: 55 IN

## 2024-03-08 DIAGNOSIS — H16.223: ICD-10-CM

## 2024-03-08 DIAGNOSIS — H40.013: ICD-10-CM

## 2024-03-08 DIAGNOSIS — H04.122: ICD-10-CM

## 2024-03-08 DIAGNOSIS — H43.822: ICD-10-CM

## 2024-03-08 DIAGNOSIS — H43.811: ICD-10-CM

## 2024-03-08 DIAGNOSIS — H53.19: ICD-10-CM

## 2024-03-08 DIAGNOSIS — H04.121: ICD-10-CM

## 2024-03-08 DIAGNOSIS — H35.373: ICD-10-CM

## 2024-03-08 PROCEDURE — 99214 OFFICE O/P EST MOD 30 MIN: CPT | Performed by: OPHTHALMOLOGY

## 2024-03-08 PROCEDURE — 92133 CPTRZD OPH DX IMG PST SGM ON: CPT | Performed by: OPHTHALMOLOGY

## 2024-09-25 ENCOUNTER — DOCTOR'S OFFICE (OUTPATIENT)
Dept: URBAN - NONMETROPOLITAN AREA CLINIC 1 | Facility: CLINIC | Age: 74
Setting detail: OPHTHALMOLOGY
End: 2024-09-25
Payer: MEDICARE

## 2024-09-25 DIAGNOSIS — H04.122: ICD-10-CM

## 2024-09-25 DIAGNOSIS — H35.373: ICD-10-CM

## 2024-09-25 DIAGNOSIS — H43.811: ICD-10-CM

## 2024-09-25 DIAGNOSIS — H40.013: ICD-10-CM

## 2024-09-25 DIAGNOSIS — H04.121: ICD-10-CM

## 2024-09-25 DIAGNOSIS — H53.19: ICD-10-CM

## 2024-09-25 DIAGNOSIS — H43.822: ICD-10-CM

## 2024-09-25 PROCEDURE — 92083 EXTENDED VISUAL FIELD XM: CPT | Performed by: OPHTHALMOLOGY

## 2024-09-25 PROCEDURE — 92134 CPTRZ OPH DX IMG PST SGM RTA: CPT | Performed by: OPHTHALMOLOGY

## 2024-09-25 PROCEDURE — 76514 ECHO EXAM OF EYE THICKNESS: CPT | Performed by: OPHTHALMOLOGY

## 2024-09-25 PROCEDURE — 99213 OFFICE O/P EST LOW 20 MIN: CPT | Performed by: OPHTHALMOLOGY

## 2024-09-25 ASSESSMENT — REFRACTION_AUTOREFRACTION
OS_AXIS: 70
OD_SPHERE: -0.25
OD_CYLINDER: -0.25
OD_AXIS: 122
OS_SPHERE: 0.00
OS_CYLINDER: -1.25

## 2024-09-25 ASSESSMENT — TONOMETRY
OD_IOP_MMHG: 19
OS_IOP_MMHG: 20

## 2024-09-25 ASSESSMENT — REFRACTION_MANIFEST
OD_VA1: 20/20
OS_SPHERE: +0.50
OD_ADD: +2.50
OS_CYLINDER: -0.50
OD_SPHERE: PLANO
OD_CYLINDER: SPH
OS_VA2: 20/20-2
OS_VA1: 20/20-2
OD_VA2: 20/20
OS_ADD: +2.50
OS_AXIS: 065

## 2024-09-25 ASSESSMENT — REFRACTION_CURRENTRX
OD_OVR_VA: 20/
OS_OVR_VA: 20/

## 2024-09-25 ASSESSMENT — PACHYMETRY
OD_CT_UM: 615
OS_CT_CORRECTION: -5
OD_CT_CORRECTION: -5
OS_CT_UM: 618

## 2024-09-25 ASSESSMENT — DRY EYES - PHYSICIAN NOTES
OD_GENERALCOMMENTS: K SICCA
OS_GENERALCOMMENTS: K SICCA

## 2024-09-25 ASSESSMENT — VISUAL ACUITY
OD_BCVA: 20/20-1
OS_BCVA: 20/20-1

## 2024-09-25 ASSESSMENT — CONFRONTATIONAL VISUAL FIELD TEST (CVF)
OD_FINDINGS: FULL
OS_FINDINGS: FULL

## 2025-04-09 ENCOUNTER — DOCTOR'S OFFICE (OUTPATIENT)
Dept: URBAN - NONMETROPOLITAN AREA CLINIC 1 | Facility: CLINIC | Age: 75
Setting detail: OPHTHALMOLOGY
End: 2025-04-09
Payer: MEDICARE

## 2025-04-09 DIAGNOSIS — H04.121: ICD-10-CM

## 2025-04-09 DIAGNOSIS — H43.822: ICD-10-CM

## 2025-04-09 DIAGNOSIS — H40.013: ICD-10-CM

## 2025-04-09 DIAGNOSIS — H35.373: ICD-10-CM

## 2025-04-09 DIAGNOSIS — H43.811: ICD-10-CM

## 2025-04-09 DIAGNOSIS — H04.122: ICD-10-CM

## 2025-04-09 PROCEDURE — 92133 CPTRZD OPH DX IMG PST SGM ON: CPT | Performed by: OPHTHALMOLOGY

## 2025-04-09 PROCEDURE — 92012 INTRM OPH EXAM EST PATIENT: CPT | Performed by: OPHTHALMOLOGY

## 2025-04-09 ASSESSMENT — REFRACTION_AUTOREFRACTION
OS_CYLINDER: -1.25
OS_SPHERE: 0.00
OD_AXIS: 122
OS_AXIS: 70
OD_CYLINDER: -0.25
OD_SPHERE: -0.25

## 2025-04-09 ASSESSMENT — REFRACTION_CURRENTRX
OS_OVR_VA: 20/
OD_OVR_VA: 20/

## 2025-04-09 ASSESSMENT — REFRACTION_MANIFEST
OD_ADD: +2.50
OS_ADD: +2.50
OD_VA2: 20/20
OS_SPHERE: +0.50
OD_VA1: 20/20
OD_SPHERE: PLANO
OS_VA2: 20/20-2
OS_CYLINDER: -0.50
OD_CYLINDER: SPH
OS_VA1: 20/20-2
OS_AXIS: 065

## 2025-04-09 ASSESSMENT — VISUAL ACUITY
OS_BCVA: 20/20-1
OD_BCVA: 20/20-1

## 2025-04-09 ASSESSMENT — DRY EYES - PHYSICIAN NOTES
OD_GENERALCOMMENTS: K SICCA
OS_GENERALCOMMENTS: K SICCA

## 2025-04-09 ASSESSMENT — TONOMETRY
OD_IOP_MMHG: 16
OS_IOP_MMHG: 17

## 2025-04-09 ASSESSMENT — CONFRONTATIONAL VISUAL FIELD TEST (CVF)
OS_FINDINGS: FULL
OD_FINDINGS: FULL

## 2025-07-16 ENCOUNTER — DOCTOR'S OFFICE (OUTPATIENT)
Dept: URBAN - NONMETROPOLITAN AREA CLINIC 1 | Facility: CLINIC | Age: 75
Setting detail: OPHTHALMOLOGY
End: 2025-07-16
Payer: MEDICARE

## 2025-07-16 DIAGNOSIS — H04.122: ICD-10-CM

## 2025-07-16 DIAGNOSIS — H04.121: ICD-10-CM

## 2025-07-16 DIAGNOSIS — H40.013: ICD-10-CM

## 2025-07-16 DIAGNOSIS — H43.822: ICD-10-CM

## 2025-07-16 DIAGNOSIS — H43.811: ICD-10-CM

## 2025-07-16 DIAGNOSIS — H35.373: ICD-10-CM

## 2025-07-16 PROCEDURE — 92134 CPTRZ OPH DX IMG PST SGM RTA: CPT | Performed by: OPHTHALMOLOGY

## 2025-07-16 PROCEDURE — 99213 OFFICE O/P EST LOW 20 MIN: CPT | Performed by: OPHTHALMOLOGY

## 2025-07-16 ASSESSMENT — CONFRONTATIONAL VISUAL FIELD TEST (CVF)
OS_FINDINGS: FULL
OD_FINDINGS: FULL

## 2025-07-16 ASSESSMENT — REFRACTION_AUTOREFRACTION
OS_SPHERE: 0.00
OS_AXIS: 70
OD_SPHERE: -0.25
OD_CYLINDER: -0.25
OD_AXIS: 122
OS_CYLINDER: -1.25

## 2025-07-16 ASSESSMENT — REFRACTION_CURRENTRX
OS_OVR_VA: 20/
OD_OVR_VA: 20/

## 2025-07-16 ASSESSMENT — TONOMETRY
OS_IOP_MMHG: 20
OD_IOP_MMHG: 20

## 2025-07-16 ASSESSMENT — REFRACTION_MANIFEST
OD_VA2: 20/20
OD_ADD: +2.50
OD_SPHERE: PLANO
OS_AXIS: 065
OS_SPHERE: +0.50
OS_VA2: 20/20-2
OD_VA1: 20/20
OS_ADD: +2.50
OS_VA1: 20/20-2
OS_CYLINDER: -0.50
OD_CYLINDER: SPH

## 2025-07-16 ASSESSMENT — SUPERFICIAL PUNCTATE KERATITIS (SPK)
OS_SPK: ABSENT
OD_SPK: ABSENT

## 2025-07-16 ASSESSMENT — VISUAL ACUITY
OS_BCVA: 20/20-1
OD_BCVA: 20/20-2